# Patient Record
Sex: FEMALE | Race: WHITE | NOT HISPANIC OR LATINO | Employment: OTHER | URBAN - METROPOLITAN AREA
[De-identification: names, ages, dates, MRNs, and addresses within clinical notes are randomized per-mention and may not be internally consistent; named-entity substitution may affect disease eponyms.]

---

## 2021-05-24 ENCOUNTER — RECORDS - HEALTHEAST (OUTPATIENT)
Dept: ADMINISTRATIVE | Facility: CLINIC | Age: 86
End: 2021-05-24

## 2021-05-25 ENCOUNTER — RECORDS - HEALTHEAST (OUTPATIENT)
Dept: ADMINISTRATIVE | Facility: CLINIC | Age: 86
End: 2021-05-25

## 2021-05-27 ENCOUNTER — RECORDS - HEALTHEAST (OUTPATIENT)
Dept: ADMINISTRATIVE | Facility: CLINIC | Age: 86
End: 2021-05-27

## 2021-05-28 ENCOUNTER — RECORDS - HEALTHEAST (OUTPATIENT)
Dept: ADMINISTRATIVE | Facility: CLINIC | Age: 86
End: 2021-05-28

## 2021-05-29 ENCOUNTER — RECORDS - HEALTHEAST (OUTPATIENT)
Dept: ADMINISTRATIVE | Facility: CLINIC | Age: 86
End: 2021-05-29

## 2021-07-13 ENCOUNTER — RECORDS - HEALTHEAST (OUTPATIENT)
Dept: ADMINISTRATIVE | Facility: CLINIC | Age: 86
End: 2021-07-13

## 2021-07-21 ENCOUNTER — RECORDS - HEALTHEAST (OUTPATIENT)
Dept: ADMINISTRATIVE | Facility: CLINIC | Age: 86
End: 2021-07-21

## 2023-09-12 ENCOUNTER — HOSPITAL ENCOUNTER (EMERGENCY)
Facility: CLINIC | Age: 88
Discharge: HOME | End: 2023-09-12
Attending: EMERGENCY MEDICINE | Admitting: EMERGENCY MEDICINE
Payer: MEDICARE

## 2023-09-12 ENCOUNTER — APPOINTMENT (OUTPATIENT)
Dept: RADIOLOGY | Facility: CLINIC | Age: 88
End: 2023-09-12
Attending: EMERGENCY MEDICINE
Payer: MEDICARE

## 2023-09-12 VITALS
SYSTOLIC BLOOD PRESSURE: 124 MMHG | RESPIRATION RATE: 28 BRPM | DIASTOLIC BLOOD PRESSURE: 56 MMHG | OXYGEN SATURATION: 93 % | TEMPERATURE: 97.6 F | WEIGHT: 105 LBS | HEIGHT: 60 IN | HEART RATE: 76 BPM | BODY MASS INDEX: 20.62 KG/M2

## 2023-09-12 DIAGNOSIS — R07.9 CHEST PAIN, UNSPECIFIED TYPE: ICD-10-CM

## 2023-09-12 LAB
ALBUMIN SERPL-MCNC: 3.7 G/DL (ref 3.5–5)
ALP SERPL-CCNC: 77 U/L (ref 45–120)
ALT SERPL W P-5'-P-CCNC: 16 U/L (ref 0–45)
ANION GAP SERPL CALCULATED.3IONS-SCNC: 14 MMOL/L (ref 5–18)
AST SERPL W P-5'-P-CCNC: 18 U/L (ref 0–40)
ATRIAL RATE - MUSE: 72 BPM
BASOPHILS # BLD AUTO: 0 10E3/UL (ref 0–0.2)
BASOPHILS NFR BLD AUTO: 0 %
BILIRUB SERPL-MCNC: 1.1 MG/DL (ref 0–1)
BNP SERPL-MCNC: 154 PG/ML (ref 0–167)
BUN SERPL-MCNC: 19 MG/DL (ref 8–28)
CALCIUM SERPL-MCNC: 9.5 MG/DL (ref 8.5–10.5)
CHLORIDE BLD-SCNC: 101 MMOL/L (ref 98–107)
CO2 SERPL-SCNC: 26 MMOL/L (ref 22–31)
CREAT SERPL-MCNC: 0.88 MG/DL (ref 0.6–1.1)
D DIMER PPP FEU-MCNC: 0.37 UG/ML FEU (ref 0–0.5)
DIASTOLIC BLOOD PRESSURE - MUSE: 75 MMHG
EGFRCR SERPLBLD CKD-EPI 2021: 64 ML/MIN/1.73M2
EOSINOPHIL # BLD AUTO: 0.1 10E3/UL (ref 0–0.7)
EOSINOPHIL NFR BLD AUTO: 1 %
ERYTHROCYTE [DISTWIDTH] IN BLOOD BY AUTOMATED COUNT: 13.7 % (ref 10–15)
GLUCOSE BLD-MCNC: 103 MG/DL (ref 70–125)
HCT VFR BLD AUTO: 43.4 % (ref 35–47)
HGB BLD-MCNC: 14.5 G/DL (ref 11.7–15.7)
IMM GRANULOCYTES # BLD: 0 10E3/UL
IMM GRANULOCYTES NFR BLD: 1 %
INTERPRETATION ECG - MUSE: NORMAL
LYMPHOCYTES # BLD AUTO: 2.3 10E3/UL (ref 0.8–5.3)
LYMPHOCYTES NFR BLD AUTO: 28 %
MCH RBC QN AUTO: 33.6 PG (ref 26.5–33)
MCHC RBC AUTO-ENTMCNC: 33.4 G/DL (ref 31.5–36.5)
MCV RBC AUTO: 101 FL (ref 78–100)
MONOCYTES # BLD AUTO: 0.7 10E3/UL (ref 0–1.3)
MONOCYTES NFR BLD AUTO: 8 %
NEUTROPHILS # BLD AUTO: 5.1 10E3/UL (ref 1.6–8.3)
NEUTROPHILS NFR BLD AUTO: 62 %
NRBC # BLD AUTO: 0 10E3/UL
NRBC BLD AUTO-RTO: 0 /100
P AXIS - MUSE: 81 DEGREES
PLATELET # BLD AUTO: 328 10E3/UL (ref 150–450)
POTASSIUM BLD-SCNC: 4.1 MMOL/L (ref 3.5–5)
PR INTERVAL - MUSE: 162 MS
PROT SERPL-MCNC: 6.7 G/DL (ref 6–8)
QRS DURATION - MUSE: 122 MS
QT - MUSE: 400 MS
QTC - MUSE: 438 MS
R AXIS - MUSE: 68 DEGREES
RBC # BLD AUTO: 4.32 10E6/UL (ref 3.8–5.2)
SODIUM SERPL-SCNC: 141 MMOL/L (ref 136–145)
SYSTOLIC BLOOD PRESSURE - MUSE: 173 MMHG
T AXIS - MUSE: 56 DEGREES
TROPONIN I SERPL-MCNC: 0.02 NG/ML (ref 0–0.29)
TROPONIN I SERPL-MCNC: 0.03 NG/ML (ref 0–0.29)
VENTRICULAR RATE- MUSE: 72 BPM
WBC # BLD AUTO: 8.1 10E3/UL (ref 4–11)

## 2023-09-12 PROCEDURE — 96374 THER/PROPH/DIAG INJ IV PUSH: CPT

## 2023-09-12 PROCEDURE — 85379 FIBRIN DEGRADATION QUANT: CPT | Performed by: EMERGENCY MEDICINE

## 2023-09-12 PROCEDURE — 84484 ASSAY OF TROPONIN QUANT: CPT | Mod: 91 | Performed by: EMERGENCY MEDICINE

## 2023-09-12 PROCEDURE — 83880 ASSAY OF NATRIURETIC PEPTIDE: CPT | Performed by: EMERGENCY MEDICINE

## 2023-09-12 PROCEDURE — 71046 X-RAY EXAM CHEST 2 VIEWS: CPT

## 2023-09-12 PROCEDURE — 85025 COMPLETE CBC W/AUTO DIFF WBC: CPT | Performed by: EMERGENCY MEDICINE

## 2023-09-12 PROCEDURE — 80053 COMPREHEN METABOLIC PANEL: CPT | Performed by: EMERGENCY MEDICINE

## 2023-09-12 PROCEDURE — 93005 ELECTROCARDIOGRAM TRACING: CPT | Performed by: EMERGENCY MEDICINE

## 2023-09-12 PROCEDURE — 250N000011 HC RX IP 250 OP 636: Mod: JZ | Performed by: EMERGENCY MEDICINE

## 2023-09-12 PROCEDURE — 99285 EMERGENCY DEPT VISIT HI MDM: CPT | Mod: 25

## 2023-09-12 PROCEDURE — 250N000013 HC RX MED GY IP 250 OP 250 PS 637: Performed by: EMERGENCY MEDICINE

## 2023-09-12 PROCEDURE — 36415 COLL VENOUS BLD VENIPUNCTURE: CPT | Performed by: EMERGENCY MEDICINE

## 2023-09-12 PROCEDURE — 96375 TX/PRO/DX INJ NEW DRUG ADDON: CPT

## 2023-09-12 RX ORDER — HYDROMORPHONE HCL IN WATER/PF 6 MG/30 ML
0.2 PATIENT CONTROLLED ANALGESIA SYRINGE INTRAVENOUS ONCE
Status: COMPLETED | OUTPATIENT
Start: 2023-09-12 | End: 2023-09-12

## 2023-09-12 RX ORDER — ASPIRIN 81 MG/1
162 TABLET, CHEWABLE ORAL ONCE
Status: COMPLETED | OUTPATIENT
Start: 2023-09-12 | End: 2023-09-12

## 2023-09-12 RX ORDER — ONDANSETRON 2 MG/ML
4 INJECTION INTRAMUSCULAR; INTRAVENOUS ONCE
Status: COMPLETED | OUTPATIENT
Start: 2023-09-12 | End: 2023-09-12

## 2023-09-12 RX ADMIN — ASPIRIN 81 MG CHEWABLE TABLET 162 MG: 81 TABLET CHEWABLE at 02:30

## 2023-09-12 RX ADMIN — HYDROMORPHONE HYDROCHLORIDE 0.2 MG: 0.2 INJECTION, SOLUTION INTRAMUSCULAR; INTRAVENOUS; SUBCUTANEOUS at 02:35

## 2023-09-12 RX ADMIN — ONDANSETRON 4 MG: 2 INJECTION INTRAMUSCULAR; INTRAVENOUS at 02:30

## 2023-09-12 ASSESSMENT — ENCOUNTER SYMPTOMS
LIGHT-HEADEDNESS: 1
SHORTNESS OF BREATH: 1
BACK PAIN: 1

## 2023-09-12 ASSESSMENT — ACTIVITIES OF DAILY LIVING (ADL)
ADLS_ACUITY_SCORE: 35
ADLS_ACUITY_SCORE: 35

## 2023-09-12 NOTE — ED PROVIDER NOTES
EMERGENCY DEPARTMENT ENCOUNTER      NAME: Kylie Amaya  AGE: 87 year old female  YOB: 1935  MRN: [id number]  EVALUATION DATE & TIME: 9/12/2023  2:00 AM    PCP: No primary care provider on file.    ED PROVIDER: Chi Savage MD    Chief Complaint   Patient presents with    Chest Pain    Hip Pain            FINAL IMPRESSION:  No diagnosis found.    ED COURSE & MEDICAL DECISION MAKING:    Pertinent Labs & Imaging studies reviewed. (See chart for details)  87 year old female presents to the Emergency Department for evaluation of chest pain symptoms.  Patient awoke from sleep tonight with a chest pressure sensation she also had feelings of lightheadedness and thought she was potentially going to pass out.  She contacted her daughter and was brought to the emergency department for assessment.  Arrival emergency department her symptoms were improving.  Patient has known hiatal hernia.  She has also been dealing with pain to her right hip rating down her leg and is currently being worked up on outpatient basis.  She is being medicated with Tylenol ibuprofen and Neurontin and steroids on an outpatient basis.  Examination was unremarkable vital signs unremarkable.  The patient was reporting near resolution of her symptoms although she did still have some residual discomfort at arrival and on my initial assessment.  ECG did not appear to be ischemic.  D-dimer negative troponin negative chest x-ray demonstrating her hiatal hernia.  This could be exacerbation of her hiatal hernia due to NSAID use combined with prednisone.  Not clearly esophagitis or gastritis in terms of its description.  Has cardiac risk factors.  With return of all of her test results I went and had a long discussion with the patient and her daughter regarding limitations of emergency department work-up of chest pain and discussing admission to the hospital for serial cardiac enzymes and provocative cardiac testing.  The patient is opposed  to this at this time.  She wants to be discharged home.  Her and her daughter are discussing the options as I laid them out for them.  I think at a minimum we would need to do a 3-hour troponin to ensure no change.  Considerations for admission versus discharge home after 3-hour troponin on antacid medications.  We will reassess after family has discussion    4:42 AM  Discussion again with the patient and her daughter.  She is not at this point willing to be admitted to the hospital in part because she feels improved.  As alternative were going to perform 3-hour troponin and continue to monitor the patient.  If she continues to remain improved and serial troponin is negative anticipate discharge home.  I did discuss with her the possibility that the NSAIDs and prednisone are escalating her hiatal hernia symptoms she already is on pantoprazole at home and is taking that twice daily.  We will reassess after return of the troponin test.    5:15 AM  Patient repeat troponin is within normal limits.  She remains asymptomatic at this time.  We are going to proceed with discharge and close follow-up on outpatient basis.  Discussed these test results with the patient and family member.  Overall plan of care for discharge.  I did instruct him that if the chest pain recurs you should return to the emergency department for repeat assessment.          2:08 AM I met with the patient to gather history and to perform my initial exam. We discussed plans for the ED course, including diagnostic testing and treatment.       Medical Decision Making    History:  Supplemental history from: Documented in chart, if applicable  External Record(s) reviewed: Documented in chart, if applicable.    Work Up:  Chart documentation includes differential considered and any EKGs or imaging independently interpreted by provider, where specified.  In additional to work up documented, I considered the following work up: Documented in chart, if  applicable.    External consultation:  Discussion of management with another provider: Documented in chart, if applicable    Complicating factors:  Care impacted by chronic illness: Cancer/Chemotherapy and Hypertension  Care affected by social determinants of health: Access to Affordable Health Care    Disposition considerations: Discharge. No recommendations on prescription strength medication(s). I considered admission, but discharged patient after significant clinical improvement.        At the conclusion of the encounter I discussed the results of all of the tests and the disposition. The questions were answered. The patient or family acknowledged understanding and was agreeable with the care plan.       MEDICATIONS GIVEN IN THE EMERGENCY:  Medications   HYDROmorphone (DILAUDID) injection 0.2 mg (0.2 mg Intravenous $Given 9/12/23 0235)   ondansetron (ZOFRAN) injection 4 mg (4 mg Intravenous $Given 9/12/23 0230)   aspirin (ASA) chewable tablet 162 mg (162 mg Oral $Given 9/12/23 0230)       NEW PRESCRIPTIONS STARTED AT TODAY'S ER VISIT  New Prescriptions    No medications on file          =================================================================    HPI    Patient information was obtained from: Patient     Use of : N/A     Kylie Amaya is a 87 year old female with a pertinent history of hypertension, skin cancer, who presents to this ED via private car for evaluation of chest pain.     Patient reports a new sudden onset of presyncopal symptoms including tunnel vision, lightheadedness, chest heaviness, and back pain at 1:00AM this morning. No previous history. Currently endorses lightheadedness. Tried tylenol and ibuprofen without relief. Of note, patient has chronic shortness of breath and is scheduled to see her cardiologist next week.     Patient has been seen by [facility] for acute on chronic back pain. She notes the pain radiates from her low back into her right leg. Scheduled  to have an MRI 9/14/23. Currently taking prednisone and gabapentin for pain.     REVIEW OF SYSTEMS   Review of Systems   Respiratory:  Positive for shortness of breath (Chronic).    Cardiovascular:  Positive for chest pain (Pressure).   Musculoskeletal:  Positive for back pain.   Neurological:  Positive for light-headedness. Negative for syncope.        Positive for presyncope     All other systems reviewed and are negative.     Pertinent positives and negatives are documented in the HPI. All other systems reviewed and are negative.    PAST MEDICAL HISTORY:  Hiatal hernia      CURRENT MEDICATIONS:    No current outpatient medications on file.      ALLERGIES:  No Known Allergies    FAMILY HISTORY:  No family history on file.    SOCIAL HISTORY:   Social History     Socioeconomic History    Marital status:        VITALS:  /63   Pulse 74   Temp 97.6  F (36.4  C) (Oral)   Resp 22   Ht 1.524 m (5')   Wt 47.6 kg (105 lb)   SpO2 94%   BMI 20.51 kg/m      PHYSICAL EXAM    PHYSICAL EXAM    Constitutional: Well developed, Well nourished, NAD  HENT: Normocephalic, Atraumatic, Bilateral external ears normal, Oropharynx normal, mucous membranes moist, Nose normal. Neck-  Normal range of motion, No tenderness, Supple, No stridor.   Eyes: PERRL, EOMI, Conjunctiva normal, No discharge.   Respiratory: Normal breath sounds, No respiratory distress, No wheezing, Speaks full sentences easily. No cough.   Cardiovascular: Normal heart rate, Regular rhythm, No murmurs Chest wall nontender.    GI:  Soft, No tenderness, No masses, No flank tenderness. No rebound or guarding.  : No cva tenderness    Musculoskeletal: 2+ DP pulses. No edema. No cyanosis. Good range of motion in all major joints. No tenderness to palpation. No tenderness of the CTLS spine.   Integument: Warm, Dry, No erythema, No rash. No petechiae.   Neurologic: Alert & oriented x 3, Normal motor function, Normal sensory function, No focal deficits noted.    Psychiatric: Affect normal, Judgment normal, Mood normal. Cooperative.     LAB:  All pertinent labs reviewed and interpreted.  Results for orders placed or performed during the hospital encounter of 09/12/23   Chest XR,  PA & LAT    Impression    IMPRESSION: Large hiatal hernia.   Comprehensive metabolic panel   Result Value Ref Range    Sodium 141 136 - 145 mmol/L    Potassium 4.1 3.5 - 5.0 mmol/L    Chloride 101 98 - 107 mmol/L    Carbon Dioxide (CO2) 26 22 - 31 mmol/L    Anion Gap 14 5 - 18 mmol/L    Urea Nitrogen 19 8 - 28 mg/dL    Creatinine 0.88 0.60 - 1.10 mg/dL    Calcium 9.5 8.5 - 10.5 mg/dL    Glucose 103 70 - 125 mg/dL    Alkaline Phosphatase 77 45 - 120 U/L    AST 18 0 - 40 U/L    ALT 16 0 - 45 U/L    Protein Total 6.7 6.0 - 8.0 g/dL    Albumin 3.7 3.5 - 5.0 g/dL    Bilirubin Total 1.1 (H) 0.0 - 1.0 mg/dL    GFR Estimate 64 >60 mL/min/1.73m2   Result Value Ref Range    Troponin I 0.02 0.00 - 0.29 ng/mL   B-Type Natriuretic Peptide ([facility] Only)   Result Value Ref Range     0 - 167 pg/mL   D dimer quantitative   Result Value Ref Range    D-Dimer Quantitative 0.37 0.00 - 0.50 ug/mL FEU   CBC with platelets and differential   Result Value Ref Range    WBC Count 8.1 4.0 - 11.0 10e3/uL    RBC Count 4.32 3.80 - 5.20 10e6/uL    Hemoglobin 14.5 11.7 - 15.7 g/dL    Hematocrit 43.4 35.0 - 47.0 %     (H) 78 - 100 fL    MCH 33.6 (H) 26.5 - 33.0 pg    MCHC 33.4 31.5 - 36.5 g/dL    RDW 13.7 10.0 - 15.0 %    Platelet Count 328 150 - 450 10e3/uL    % Neutrophils 62 %    % Lymphocytes 28 %    % Monocytes 8 %    % Eosinophils 1 %    % Basophils 0 %    % Immature Granulocytes 1 %    NRBCs per 100 WBC 0 <1 /100    Absolute Neutrophils 5.1 1.6 - 8.3 10e3/uL    Absolute Lymphocytes 2.3 0.8 - 5.3 10e3/uL    Absolute Monocytes 0.7 0.0 - 1.3 10e3/uL    Absolute Eosinophils 0.1 0.0 - 0.7 10e3/uL    Absolute Basophils 0.0 0.0 - 0.2 10e3/uL    Absolute Immature Granulocytes 0.0 <=0.4 10e3/uL    Absolute NRBCs 0.0  10e3/uL   ECG 12-LEAD WITH MUSE ([facility])   Result Value Ref Range    Systolic Blood Pressure 173 mmHg    Diastolic Blood Pressure 75 mmHg    Ventricular Rate 72 BPM    Atrial Rate 72 BPM    SD Interval 162 ms    QRS Duration 122 ms     ms    QTc 438 ms    P Axis 81 degrees    R AXIS 68 degrees    T Axis 56 degrees    Interpretation ECG       Sinus rhythm  Right bundle branch block  Abnormal ECG  When compared with ECG of 12-SEP-2023 02:08,  No significant change was found  Confirmed by SEE ED PROVIDER NOTE FOR, ECG INTERPRETATION ([id number]),  Henry Campos ([id number]) on 9/12/2023 2:14:56 AM       RADIOLOGY:  Reviewed all pertinent imaging. Please see official radiology report.  Chest XR,  PA & LAT   Final Result   IMPRESSION: Large hiatal hernia.        EKG:    Performed at: 02:10    Impression: Sinus rhythm. Right bundle branch block     Rate: 72 bpm  Rhythm: Sinus  Axis: 68   SD Interval: 162 ms  QRS Interval: 122 ms   QTc Interval: 438 ms   ST Changes: No significant ST elevations or depressions   Comparison: When compared to EKG from 9/12/23 (initial) no significant change was found.     I have independently reviewed and interpreted the EKG(s) documented above.    I, Deja Osorio, am serving as a scribe to document services personally performed by Chi Savage MD based on my observation and the provider's statements to me. I, Chi Savage MD, attest that Deja Osorio is acting in a scribe capacity, has observed my performance of the services and has documented them in accordance with my direction.    Chi Savage MD  [facility] EMERGENCY ROOM  [address] [id number] [phone]       Chi Savage MD  09/12/23 [id number]

## 2023-09-12 NOTE — ED TRIAGE NOTES
Patient arrives to the ER with c/o chest pain/pressure that started a couple hours ago. Denies shortness of breath. At 0100, patient took 2 Tylenol and 1 ibuprofen for her right hip pain with no relief. Patient states that she has been working with [email or web address] in regards to her hip pain.      Triage Assessment       Row Name 09/12/23 0207       Triage Assessment (Adult)    Airway WDL WDL       Respiratory WDL    Respiratory WDL WDL       Skin Circulation/Temperature WDL    Skin Circulation/Temperature WDL WDL       Cardiac WDL    Cardiac WDL X;chest pain       Chest Pain Assessment    Chest Pain Location midsternal    Chest Pain Radiation back    Character pressure    Precipitating Factors activity       Peripheral/Neurovascular WDL    Peripheral Neurovascular WDL WDL       Cognitive/Neuro/Behavioral WDL    Cognitive/Neuro/Behavioral WDL WDL

## 2023-09-12 NOTE — DISCHARGE INSTRUCTIONS
Overall your work-up in the emergency department was reassuring.  I do recommend close follow-up with your primary care physician as well as our cardiology team.  I have placed a referral to our rapid access cardiology services.  Continue your current medications.  I do recommend considerations for decreasing the ibuprofen usage as this could be exacerbating her hiatal hernia.  Continue to take your antacid medication.  If you have escalation to your symptoms or chest pain recurs I recommend repeat emergency department evaluation.

## 2023-09-16 ENCOUNTER — APPOINTMENT (OUTPATIENT)
Dept: ULTRASOUND IMAGING | Facility: CLINIC | Age: 88
End: 2023-09-16
Attending: EMERGENCY MEDICINE
Payer: MEDICARE

## 2023-09-16 ENCOUNTER — HOSPITAL ENCOUNTER (OUTPATIENT)
Facility: CLINIC | Age: 88
Setting detail: OBSERVATION
Discharge: HOME | End: 2023-09-18
Attending: EMERGENCY MEDICINE | Admitting: EMERGENCY MEDICINE
Payer: MEDICARE

## 2023-09-16 DIAGNOSIS — M25.551 RIGHT HIP PAIN: ICD-10-CM

## 2023-09-16 PROBLEM — I10 ESSENTIAL HYPERTENSION: Status: ACTIVE | Noted: 2023-09-16

## 2023-09-16 PROBLEM — K44.9 HIATAL HERNIA: Status: ACTIVE | Noted: 2022-04-15

## 2023-09-16 PROCEDURE — 96375 TX/PRO/DX INJ NEW DRUG ADDON: CPT

## 2023-09-16 PROCEDURE — 99285 EMERGENCY DEPT VISIT HI MDM: CPT | Mod: 25

## 2023-09-16 PROCEDURE — G0378 HOSPITAL OBSERVATION PER HR: HCPCS

## 2023-09-16 PROCEDURE — 250N000011 HC RX IP 250 OP 636: Mod: JZ | Performed by: STUDENT IN AN ORGANIZED HEALTH CARE EDUCATION/TRAINING PROGRAM

## 2023-09-16 PROCEDURE — 96376 TX/PRO/DX INJ SAME DRUG ADON: CPT

## 2023-09-16 PROCEDURE — 250N000013 HC RX MED GY IP 250 OP 250 PS 637: Performed by: STUDENT IN AN ORGANIZED HEALTH CARE EDUCATION/TRAINING PROGRAM

## 2023-09-16 PROCEDURE — 93971 EXTREMITY STUDY: CPT | Mod: RT

## 2023-09-16 PROCEDURE — 250N000011 HC RX IP 250 OP 636: Performed by: EMERGENCY MEDICINE

## 2023-09-16 PROCEDURE — 96374 THER/PROPH/DIAG INJ IV PUSH: CPT

## 2023-09-16 RX ORDER — GABAPENTIN 300 MG/1
300 CAPSULE ORAL AT BEDTIME
Status: ON HOLD | COMMUNITY
Start: 2023-09-07 | End: 2023-09-18

## 2023-09-16 RX ORDER — ONDANSETRON 2 MG/ML
4 INJECTION INTRAMUSCULAR; INTRAVENOUS EVERY 6 HOURS PRN
Status: DISCONTINUED | OUTPATIENT
Start: 2023-09-16 | End: 2023-09-17

## 2023-09-16 RX ORDER — TRAZODONE HYDROCHLORIDE 50 MG/1
50 TABLET, FILM COATED ORAL
COMMUNITY
End: 2023-09-16

## 2023-09-16 RX ORDER — NALOXONE HYDROCHLORIDE 0.4 MG/ML
0.4 INJECTION, SOLUTION INTRAMUSCULAR; INTRAVENOUS; SUBCUTANEOUS
Status: DISCONTINUED | OUTPATIENT
Start: 2023-09-16 | End: 2023-09-18 | Stop reason: HOSPADM

## 2023-09-16 RX ORDER — NALOXONE HYDROCHLORIDE 0.4 MG/ML
0.2 INJECTION, SOLUTION INTRAMUSCULAR; INTRAVENOUS; SUBCUTANEOUS
Status: DISCONTINUED | OUTPATIENT
Start: 2023-09-16 | End: 2023-09-18 | Stop reason: HOSPADM

## 2023-09-16 RX ORDER — HYDROMORPHONE HYDROCHLORIDE 1 MG/ML
0.5 INJECTION, SOLUTION INTRAMUSCULAR; INTRAVENOUS; SUBCUTANEOUS ONCE
Status: COMPLETED | OUTPATIENT
Start: 2023-09-16 | End: 2023-09-16

## 2023-09-16 RX ORDER — ASPIRIN 81 MG/1
81 TABLET ORAL DAILY
Status: ON HOLD | COMMUNITY
End: 2023-11-24

## 2023-09-16 RX ORDER — CYANOCOBALAMIN 1000 UG/ML
1 INJECTION, SOLUTION INTRAMUSCULAR; SUBCUTANEOUS
COMMUNITY

## 2023-09-16 RX ORDER — ONDANSETRON 2 MG/ML
4 INJECTION INTRAMUSCULAR; INTRAVENOUS ONCE
Status: COMPLETED | OUTPATIENT
Start: 2023-09-16 | End: 2023-09-16

## 2023-09-16 RX ORDER — PANTOPRAZOLE SODIUM 20 MG/1
20 TABLET, DELAYED RELEASE ORAL 2 TIMES DAILY
Status: DISCONTINUED | OUTPATIENT
Start: 2023-09-16 | End: 2023-09-18 | Stop reason: HOSPADM

## 2023-09-16 RX ORDER — FUROSEMIDE 20 MG
20 TABLET ORAL EVERY MORNING
Status: DISCONTINUED | OUTPATIENT
Start: 2023-09-17 | End: 2023-09-18 | Stop reason: HOSPADM

## 2023-09-16 RX ORDER — ONDANSETRON 4 MG/1
4 TABLET, ORALLY DISINTEGRATING ORAL EVERY 6 HOURS PRN
Status: DISCONTINUED | OUTPATIENT
Start: 2023-09-16 | End: 2023-09-17

## 2023-09-16 RX ORDER — PANTOPRAZOLE SODIUM 20 MG/1
20 TABLET, DELAYED RELEASE ORAL
COMMUNITY
End: 2023-09-16

## 2023-09-16 RX ORDER — ASPIRIN 81 MG/1
81 TABLET ORAL DAILY
Status: DISCONTINUED | OUTPATIENT
Start: 2023-09-17 | End: 2023-09-18 | Stop reason: HOSPADM

## 2023-09-16 RX ORDER — CELECOXIB 100 MG/1
100 CAPSULE ORAL DAILY
COMMUNITY
End: 2023-10-31

## 2023-09-16 RX ORDER — GABAPENTIN 300 MG/1
300 CAPSULE ORAL AT BEDTIME
Status: DISCONTINUED | OUTPATIENT
Start: 2023-09-16 | End: 2023-09-16

## 2023-09-16 RX ORDER — PANTOPRAZOLE SODIUM 20 MG/1
20 TABLET, DELAYED RELEASE ORAL 2 TIMES DAILY
COMMUNITY

## 2023-09-16 RX ORDER — ATORVASTATIN CALCIUM 20 MG/1
20 TABLET, FILM COATED ORAL AT BEDTIME
COMMUNITY
End: 2023-11-09

## 2023-09-16 RX ORDER — OXYCODONE HYDROCHLORIDE 5 MG/1
5 TABLET ORAL EVERY 4 HOURS PRN
Status: DISCONTINUED | OUTPATIENT
Start: 2023-09-16 | End: 2023-09-17

## 2023-09-16 RX ORDER — ACETAMINOPHEN 325 MG/1
975 TABLET ORAL EVERY 6 HOURS
Status: DISCONTINUED | OUTPATIENT
Start: 2023-09-16 | End: 2023-09-18 | Stop reason: HOSPADM

## 2023-09-16 RX ORDER — HYDROMORPHONE HCL IN WATER/PF 6 MG/30 ML
0.2 PATIENT CONTROLLED ANALGESIA SYRINGE INTRAVENOUS
Status: DISCONTINUED | OUTPATIENT
Start: 2023-09-16 | End: 2023-09-17

## 2023-09-16 RX ORDER — ATORVASTATIN CALCIUM 10 MG/1
20 TABLET, FILM COATED ORAL AT BEDTIME
Status: DISCONTINUED | OUTPATIENT
Start: 2023-09-16 | End: 2023-09-18 | Stop reason: HOSPADM

## 2023-09-16 RX ORDER — ONDANSETRON 4 MG/1
8 TABLET, FILM COATED ORAL EVERY 8 HOURS PRN
COMMUNITY
Start: 2023-09-13

## 2023-09-16 RX ORDER — LORATADINE 10 MG/1
10 TABLET ORAL DAILY
Status: ON HOLD | COMMUNITY
End: 2023-11-21

## 2023-09-16 RX ORDER — POLYETHYLENE GLYCOL 3350 17 G/17G
17 POWDER, FOR SOLUTION ORAL DAILY
Status: DISCONTINUED | OUTPATIENT
Start: 2023-09-17 | End: 2023-09-18 | Stop reason: HOSPADM

## 2023-09-16 RX ORDER — ACETAMINOPHEN 500 MG
1000 TABLET ORAL EVERY 6 HOURS PRN
Status: ON HOLD | COMMUNITY
End: 2023-09-18

## 2023-09-16 RX ORDER — HYDRALAZINE HYDROCHLORIDE 20 MG/ML
10 INJECTION INTRAMUSCULAR; INTRAVENOUS EVERY 6 HOURS PRN
Status: DISCONTINUED | OUTPATIENT
Start: 2023-09-16 | End: 2023-09-18 | Stop reason: HOSPADM

## 2023-09-16 RX ORDER — GABAPENTIN 300 MG/1
300 CAPSULE ORAL 3 TIMES DAILY
Status: DISCONTINUED | OUTPATIENT
Start: 2023-09-16 | End: 2023-09-18 | Stop reason: HOSPADM

## 2023-09-16 RX ORDER — IBUPROFEN 200 MG
200 TABLET ORAL EVERY 6 HOURS PRN
COMMUNITY
Start: 2023-05-09 | End: 2023-10-31

## 2023-09-16 RX ORDER — FUROSEMIDE 20 MG
20 TABLET ORAL DAILY PRN
COMMUNITY

## 2023-09-16 RX ADMIN — GABAPENTIN 300 MG: 300 CAPSULE ORAL at 22:19

## 2023-09-16 RX ADMIN — HYDROMORPHONE HYDROCHLORIDE 0.5 MG: 1 INJECTION, SOLUTION INTRAMUSCULAR; INTRAVENOUS; SUBCUTANEOUS at 16:05

## 2023-09-16 RX ADMIN — ONDANSETRON 4 MG: 2 INJECTION INTRAMUSCULAR; INTRAVENOUS at 20:08

## 2023-09-16 RX ADMIN — HYDRALAZINE HYDROCHLORIDE 10 MG: 20 INJECTION, SOLUTION INTRAMUSCULAR; INTRAVENOUS at 21:56

## 2023-09-16 RX ADMIN — HYDROMORPHONE HYDROCHLORIDE 0.5 MG: 1 INJECTION, SOLUTION INTRAMUSCULAR; INTRAVENOUS; SUBCUTANEOUS at 19:32

## 2023-09-16 RX ADMIN — PANTOPRAZOLE SODIUM 20 MG: 20 TABLET, DELAYED RELEASE ORAL at 22:19

## 2023-09-16 RX ADMIN — ACETAMINOPHEN 975 MG: 325 TABLET ORAL at 22:19

## 2023-09-16 RX ADMIN — ATORVASTATIN CALCIUM 20 MG: 10 TABLET, FILM COATED ORAL at 22:19

## 2023-09-16 RX ADMIN — HYDROMORPHONE HYDROCHLORIDE 0.2 MG: 0.2 INJECTION, SOLUTION INTRAMUSCULAR; INTRAVENOUS; SUBCUTANEOUS at 22:17

## 2023-09-16 RX ADMIN — ONDANSETRON 4 MG: 2 INJECTION INTRAMUSCULAR; INTRAVENOUS at 16:05

## 2023-09-16 ASSESSMENT — ACTIVITIES OF DAILY LIVING (ADL)
ADLS_ACUITY_SCORE: 24
ADLS_ACUITY_SCORE: 35
ADLS_ACUITY_SCORE: 33
ADLS_ACUITY_SCORE: 35
ADLS_ACUITY_SCORE: 24
DEPENDENT_IADLS:: CLEANING;COOKING;SHOPPING;TRANSPORTATION

## 2023-09-16 NOTE — ED PROVIDER NOTES
EMERGENCY DEPARTMENT ENCOUNTER      NAME: Kylie Amaya  AGE: 87 year old female  YOB: 1935  MRN: [id number]  EVALUATION DATE & TIME: 9/16/2023  2:53 PM    PCP: Catrina Velasco See    ED PROVIDER: Josey Platt MD      Chief Complaint   Patient presents with    Hip Pain         FINAL IMPRESSION:  1. Right hip pain          ED COURSE & MEDICAL DECISION MAKING:    Pertinent Labs & Imaging studies reviewed. (See chart for details)    3:34 PM I introduced myself to the patient, obtained patient history, performed a physical exam, and discussed plan for ED workup including potential diagnostic laboratory/imaging studies and interventions.  4:25 PM I spoke [facility], Dr. Riggs.  6:12 PM I updated the patient.  6:36 PM I checked on the patient.  7:19 PM I spoke with Hospitalist, patient was admitted.     87 year old female with a pertinent history of hypertension, hiatal hernia, left hip replacement, ongoing right hip pain and osteoporosis who presents to this ED for evaluation of right hip pain.  Patient has been having ongoing pain in her right hip and groin area radiating down her leg and somewhat in the right buttock area.  She has been following with [facility] and had an MRI of her lumbar spine as well as her right hip this past Friday.  She states she was called with the results for the spine showing some stenosis but that the spine physician felt that this was likely osteoarthritis causing her right hip pain and not originating from her back.  She has not heard the results of the right hip MRI.  She has a follow-up appointment in 2 days.  Daughter was concerned about her level of pain at home and thus brought her here for evaluation.  Patient lives independently and states she cannot take the medication including the oxycodone that these prescribed for pain due to nausea.  She is also prescribed Celebrex and states she has difficulty taking any of the medicine due to GERD.   She has not taken anything for pain today.  She has been able to ambulate with a walker.  However states it is difficult and painful.  Daughter is concerned about her falling at home.  On exam she does not have any focal neurologic deficits.  Is no signs or symptoms of spinal cord compression at this time.  As she just had this MRI performed yesterday was trying to avoid repeating imaging.  I contacted Dr. Riggs at [facility] as unfortunately I cannot review the results in care everywhere.  He reviewed the spinal as well as hip MRI and agreed that this was severe osteoarthritis likely leading to her right hip pain and will require hip replacement however this does not need to be performed emergently.  He stated she is having the expected amount of pain with what her MRI appears like.  He states there is no sign of fracture and also no sign of cauda equina or other significant spine pathology.  He recommended pain control and discharged with importance of follow-up on Monday as they will discuss operative planning.  We have performed a right lower extremity Doppler venous ultrasound to rule out DVT which was normal.  She had received a dose of 0.5 mg of IV Dilaudid as well as 4 mg of IV Zofran and an additional second dose of IV Dilaudid.  With this she was able to ambulate with a walker to the bathroom without difficulty.  I discussed this potential plan with the patient and her daughter recommended by orthopedics.  Daughter is concerned about her ability to function at home with the pain that she has that she lives independently and that she has no help at home.  Discussed that orthopedics would not perform any emergent procedures here but that if she feels the patient cannot function at home she could be admitted for further pain control but likely will not have anything further performed here.  Discussed that unfortunately to obtain pain control she is likely going to have some side effects from  the medication and it is a balance.  Also discussed that as she is able to ambulate here with a walker we would recommend she continue to do so and could potentially be discharged however again the daughter and the patient requested admission.  I spoke with the hospitalist who excepted the patient for admission for intractable pain.  They will consult the pain team.  Patient was admitted in stable condition.         At the conclusion of the encounter I discussed the results of all of the tests and the disposition. The questions were answered. The patient or family acknowledged understanding and was agreeable with the care plan.           Medical Decision Making    History:  Supplemental history from: Documented in chart, if applicable  External Record(s) reviewed: Documented in chart, if applicable.    Work Up:  Chart documentation includes differential considered and any EKGs or imaging independently interpreted by provider.  In additional to work up documented, I considered the following work up: Documented in chart, if applicable.    External consultation:  Discussion of management with another provider: Documented in chart, if applicable    Complicating factors:  Care impacted by chronic illness: Chronic Pain and Hypertension  Care affected by social determinants of health: N/A    Disposition considerations: Admit.      MEDICATIONS GIVEN IN THE EMERGENCY:  Medications   HYDROmorphone (PF) (DILAUDID) injection 0.5 mg (0.5 mg Intravenous $Given 9/16/23 1605)   ondansetron (ZOFRAN) injection 4 mg (4 mg Intravenous $Given 9/16/23 1605)   HYDROmorphone (PF) (DILAUDID) injection 0.5 mg (0.5 mg Intravenous $Given 9/16/23 1932)       NEW PRESCRIPTIONS STARTED AT TODAY'S ER VISIT           =================================================================    HPI    Patient information was obtained from: Patient and family member    Use of : N/A       Kylie Amaya is a 87 year old female with a pertinent  "history of hypertension, hiatal hernia, left hip replacement, and osteoporosis who presents to this ED for evaluation of hip pain.    Per chart review, patient presented to [facility] Emergency Room on 9/12/23 for evaluation of hip and chest pain. Patient reported a new sudden onset of presyncopal symptoms including tunnel vision, lightheadedness, chest heaviness, and back pain at 1:00AM this morning. No previous history. Tried tylenol and ibuprofen without relief. Of note, patient has chronic shortness of breath and is scheduled to see her cardiologist next week. Work up unremarkable at that time.      Patient has been seen by [facility] for acute on chronic back pain. She notes the pain radiates from her low back into her right leg. Scheduled to have an MRI 9/14/23. Currently taking prednisone and gabapentin for pain.     Patient reports ongoing right sided hip pain for the past few weeks. She states that she went to [facility] for an MRI of back and hips on 9/7. Patient said since her physical therapy appointment on 8/28, her right hip pain has progressively gotten worse. She notes that the pain radiates to her groin and into her right leg. She describes the pain in her right leg as a \"pulsing\" sensation. Due to her pain, patient has trouble walking and can not bend over. She usually uses a walker. She experiences pain even when she is lying down. Patient can't tolerate her pain medications because they make her nauseous and cause abdominal pain due to her hiatal hernia. Patient has tried taking Zofran with no relief. She has not taken anything for pain today but she did take hydrocodone yesterday. Patient has an appointment for her hip on 9/18 with [facility]. Daughter however was concerned about pain and her living independently and thus brought her to the ER today. Pain is not specifically different than it has been over the past multiple weeks. Patient denies fall or trauma to the right " hip.    Patient denies numbness, tingling, saddle anesthesia, bowel or bladder incontinence, fever, cough, new shortness of breath, chest pain, abdominal pain, swelling, vomiting, diarrhea, and other acute symptoms.    REVIEW OF SYSTEMS   Review of Systems   Pertinent positives and negatives are documented in the HPI. All other systems reviewed and are negative.      PAST MEDICAL HISTORY:  No past medical history on file.    PAST SURGICAL HISTORY:  No past surgical history on file.        CURRENT MEDICATIONS:    acetaminophen (TYLENOL) 325 MG tablet  aspirin 81 MG EC tablet  atorvastatin (LIPITOR) 20 MG tablet  celecoxib (CELEBREX) 100 MG capsule  cyanocobalamin (CYANOCOBALAMIN) 1000 MCG/ML injection  diclofenac (VOLTAREN) 1 % topical gel  furosemide (LASIX) 20 MG tablet  gabapentin (NEURONTIN) 300 MG capsule  hydrOXYzine (ATARAX) 25 MG tablet  ibuprofen (ADVIL/MOTRIN) 200 MG tablet  lidocaine (XYLOCAINE) 5 % external ointment  loratadine (CLARITIN) 10 MG tablet  ondansetron (ZOFRAN) 4 MG tablet  pantoprazole (PROTONIX) 20 MG EC tablet        ALLERGIES:  No Known Allergies    FAMILY HISTORY:  No family history on file.    SOCIAL HISTORY:   Social History     Socioeconomic History    Marital status:        VITALS:  /60 (BP Location: Right arm)   Pulse 60   Temp 98.6  F (37  C) (Oral)   Resp 16   Ht 1.524 m (5')   Wt 47.6 kg (105 lb)   SpO2 93%   BMI 20.51 kg/m      PHYSICAL EXAM    Physical Exam  Constitutional: Well developed, Well nourished, NAD, GCS 15  HENT: Normocephalic, Atraumatic, Bilateral external ears normal, Oropharynx normal, mucous membranes moist, Nose normal. Neck-  Normal range of motion, No tenderness, Supple, No stridor.    Eyes: PERRL, EOMI, Conjunctiva normal, No discharge.   Respiratory: Normal breath sounds, No respiratory distress, No wheezing or crackles, Speaks in full sentences easily.    Cardiovascular: Normal heart rate, Regular rhythm,  No murmurs, No rubs, No  gallops. 2+ radial pulses bilaterally  GI: Bowel sounds normal, Soft, No tenderness, No masses, No rebound or guarding. No CVA tenderness bilaterally    Musculoskeletal: 2+ DP and PT pulses. No notable lower extremity edema.  Good range of motion in all major joints. Does report pain in right hip with movement but no joint swelling, erythema, or limited ROM. No major deformities noted. No midline tenderness of the CTLS spine. Pain diffusely in the right hip/groin and into the buttock and right lower back.   Integument: Warm, Dry, No erythema, No rash. No petechiae.   Neurologic: Alert & oriented x 3, 5/5 strength in all 4 extremities bilaterally. Sensation intact to light touch in all 4 extremities and the face bilaterally. No focal deficits noted.  Psychiatric: Affect normal, Judgment normal, Mood normal. Cooperative.      LAB:  All pertinent labs reviewed and interpreted.  Results for orders placed or performed during the hospital encounter of 09/16/23   US Lower Extremity Venous Duplex Right    Impression    IMPRESSION:  1.  No deep venous thrombosis in the right lower extremity.       RADIOLOGY:  Reviewed all pertinent imaging. Please see official radiology report.  US Lower Extremity Venous Duplex Right   Final Result   IMPRESSION:   1.  No deep venous thrombosis in the right lower extremity.            PROCEDURES:   None      [facility] Documentation:   CMS Diagnoses:               I, Demarco Lacy, am serving as a scribe to document services personally performed by Josey Platt MD based on my observation and the provider's statements to me. I, Josey Platt MD, attest that Demarco Lacy  is acting in a scribe capacity, has observed my performance of the services and has documented them in accordance with my direction.    Josey Platt MD  [facility] EMERGENCY ROOM  [address] [address] [phone]     Josey Platt,  MD  10/02/23 7947

## 2023-09-16 NOTE — ED TRIAGE NOTES
The patient presents to the ED with right hip pain that has been present for the past 5-6 weeks. The patient states she is seeing [facility] and had an MRI of her back and hips done recently. They are unsure what is causing the pain. Previous left hip surgery. Was seen here last week for a hiatal hernia.   Nothing for pain today, states she is having difficulty tolerating tramadol or vicodin due to nausea when she takes those medications.

## 2023-09-17 ENCOUNTER — APPOINTMENT (OUTPATIENT)
Dept: PHYSICAL THERAPY | Facility: CLINIC | Age: 88
End: 2023-09-17
Attending: STUDENT IN AN ORGANIZED HEALTH CARE EDUCATION/TRAINING PROGRAM
Payer: MEDICARE

## 2023-09-17 ENCOUNTER — APPOINTMENT (OUTPATIENT)
Dept: OCCUPATIONAL THERAPY | Facility: CLINIC | Age: 88
End: 2023-09-17
Attending: STUDENT IN AN ORGANIZED HEALTH CARE EDUCATION/TRAINING PROGRAM
Payer: MEDICARE

## 2023-09-17 PROCEDURE — 97535 SELF CARE MNGMENT TRAINING: CPT | Mod: GO

## 2023-09-17 PROCEDURE — 250N000009 HC RX 250: Performed by: PHYSICIAN ASSISTANT

## 2023-09-17 PROCEDURE — 97165 OT EVAL LOW COMPLEX 30 MIN: CPT | Mod: GO

## 2023-09-17 PROCEDURE — 250N000013 HC RX MED GY IP 250 OP 250 PS 637: Performed by: STUDENT IN AN ORGANIZED HEALTH CARE EDUCATION/TRAINING PROGRAM

## 2023-09-17 PROCEDURE — G0378 HOSPITAL OBSERVATION PER HR: HCPCS

## 2023-09-17 PROCEDURE — 250N000013 HC RX MED GY IP 250 OP 250 PS 637

## 2023-09-17 PROCEDURE — 96376 TX/PRO/DX INJ SAME DRUG ADON: CPT

## 2023-09-17 PROCEDURE — 250N000011 HC RX IP 250 OP 636: Mod: JZ | Performed by: STUDENT IN AN ORGANIZED HEALTH CARE EDUCATION/TRAINING PROGRAM

## 2023-09-17 PROCEDURE — 97161 PT EVAL LOW COMPLEX 20 MIN: CPT | Mod: GP | Performed by: PHYSICAL THERAPIST

## 2023-09-17 PROCEDURE — 250N000013 HC RX MED GY IP 250 OP 250 PS 637: Performed by: PHYSICIAN ASSISTANT

## 2023-09-17 PROCEDURE — 250N000013 HC RX MED GY IP 250 OP 250 PS 637: Performed by: ORTHOPAEDIC SURGERY

## 2023-09-17 PROCEDURE — 97116 GAIT TRAINING THERAPY: CPT | Mod: GP | Performed by: PHYSICAL THERAPIST

## 2023-09-17 PROCEDURE — 99222 1ST HOSP IP/OBS MODERATE 55: CPT | Mod: GC | Performed by: STUDENT IN AN ORGANIZED HEALTH CARE EDUCATION/TRAINING PROGRAM

## 2023-09-17 RX ORDER — SENNOSIDES 8.6 MG
8.6 TABLET ORAL 2 TIMES DAILY PRN
Status: DISCONTINUED | OUTPATIENT
Start: 2023-09-17 | End: 2023-09-18 | Stop reason: HOSPADM

## 2023-09-17 RX ORDER — SENNOSIDES 8.6 MG
8.6 TABLET ORAL 2 TIMES DAILY
Status: DISCONTINUED | OUTPATIENT
Start: 2023-09-17 | End: 2023-09-17

## 2023-09-17 RX ORDER — HYDROXYZINE HYDROCHLORIDE 25 MG/1
25 TABLET, FILM COATED ORAL EVERY 6 HOURS PRN
Status: DISCONTINUED | OUTPATIENT
Start: 2023-09-17 | End: 2023-09-18 | Stop reason: HOSPADM

## 2023-09-17 RX ORDER — HYDROXYZINE HYDROCHLORIDE 25 MG/1
50 TABLET, FILM COATED ORAL EVERY 6 HOURS PRN
Status: DISCONTINUED | OUTPATIENT
Start: 2023-09-17 | End: 2023-09-17 | Stop reason: SINTOL

## 2023-09-17 RX ORDER — LIDOCAINE 50 MG/G
OINTMENT TOPICAL 4 TIMES DAILY
Status: DISCONTINUED | OUTPATIENT
Start: 2023-09-17 | End: 2023-09-18 | Stop reason: HOSPADM

## 2023-09-17 RX ORDER — PROMETHAZINE HYDROCHLORIDE 25 MG/1
25 TABLET ORAL EVERY 6 HOURS PRN
Status: DISCONTINUED | OUTPATIENT
Start: 2023-09-17 | End: 2023-09-18 | Stop reason: HOSPADM

## 2023-09-17 RX ORDER — CALCIUM CARBONATE 500 MG/1
500 TABLET, CHEWABLE ORAL DAILY PRN
Status: DISCONTINUED | OUTPATIENT
Start: 2023-09-17 | End: 2023-09-18 | Stop reason: HOSPADM

## 2023-09-17 RX ADMIN — PANTOPRAZOLE SODIUM 20 MG: 20 TABLET, DELAYED RELEASE ORAL at 18:34

## 2023-09-17 RX ADMIN — HYDROMORPHONE HYDROCHLORIDE 0.2 MG: 0.2 INJECTION, SOLUTION INTRAMUSCULAR; INTRAVENOUS; SUBCUTANEOUS at 02:18

## 2023-09-17 RX ADMIN — FUROSEMIDE 20 MG: 20 TABLET ORAL at 08:57

## 2023-09-17 RX ADMIN — GABAPENTIN 300 MG: 300 CAPSULE ORAL at 08:57

## 2023-09-17 RX ADMIN — ONDANSETRON 4 MG: 2 INJECTION INTRAMUSCULAR; INTRAVENOUS at 02:16

## 2023-09-17 RX ADMIN — LIDOCAINE: 50 OINTMENT TOPICAL at 14:27

## 2023-09-17 RX ADMIN — SENNOSIDES 8.6 MG: 8.6 TABLET, FILM COATED ORAL at 08:56

## 2023-09-17 RX ADMIN — LIDOCAINE: 50 OINTMENT TOPICAL at 20:45

## 2023-09-17 RX ADMIN — ACETAMINOPHEN 975 MG: 325 TABLET ORAL at 08:57

## 2023-09-17 RX ADMIN — ATORVASTATIN CALCIUM 20 MG: 10 TABLET, FILM COATED ORAL at 20:48

## 2023-09-17 RX ADMIN — DICLOFENAC 2 G: 10 GEL TOPICAL at 18:19

## 2023-09-17 RX ADMIN — PANTOPRAZOLE SODIUM 20 MG: 20 TABLET, DELAYED RELEASE ORAL at 08:56

## 2023-09-17 RX ADMIN — GABAPENTIN 300 MG: 300 CAPSULE ORAL at 18:18

## 2023-09-17 RX ADMIN — ASPIRIN 81 MG: 81 TABLET, COATED ORAL at 08:57

## 2023-09-17 RX ADMIN — HYDROXYZINE HYDROCHLORIDE 25 MG: 25 TABLET, FILM COATED ORAL at 08:59

## 2023-09-17 RX ADMIN — DICLOFENAC 2 G: 10 GEL TOPICAL at 12:43

## 2023-09-17 RX ADMIN — CALCIUM CARBONATE (ANTACID) CHEW TAB 500 MG 500 MG: 500 CHEW TAB at 11:31

## 2023-09-17 RX ADMIN — ACETAMINOPHEN 975 MG: 325 TABLET ORAL at 18:18

## 2023-09-17 RX ADMIN — PROMETHAZINE HYDROCHLORIDE 25 MG: 25 TABLET ORAL at 09:43

## 2023-09-17 ASSESSMENT — ACTIVITIES OF DAILY LIVING (ADL)
ADLS_ACUITY_SCORE: 25
ADLS_ACUITY_SCORE: 25
ADLS_ACUITY_SCORE: 26
ADLS_ACUITY_SCORE: 25
ADLS_ACUITY_SCORE: 24
ADLS_ACUITY_SCORE: 24
ADLS_ACUITY_SCORE: 25
ADLS_ACUITY_SCORE: 24

## 2023-09-17 NOTE — PHARMACY-ADMISSION MEDICATION HISTORY
Pharmacist Admission Medication History    Admission medication history is complete. The information provided in this note is only as accurate as the sources available at the time of the update.    Medication reconciliation/reorder completed by provider prior to medication history? No    Information Source(s): Patient and CareEverywhere/SureScripts via in-person    Pertinent Information:     1) Tramadol, Norco - patient notes she stopped taking these medications due to nausea, not tolerating them.  2) Trazodone - she stopped taking this since the gabapentin was replacing this for sleep and pain; however, she hasn't been taking the gabapentin for a few days.    Changes made to PTA medication list:  Added: all medications are new to this encounter    Medication Affordability:       Allergies reviewed with patient and updates made in EHR: yes    Medications available for use during hospital stay: NONE.     Medication History Completed By: Rosa Emmanuel RPH 9/16/2023 7:45 PM    PTA Med List   Medication Sig Last Dose    acetaminophen (TYLENOL) 500 MG tablet Take 1,000 mg by mouth every 6 hours as needed for mild pain 9/15/2023    aspirin 81 MG EC tablet Take 81 mg by mouth daily 9/16/2023 at am    atorvastatin (LIPITOR) 20 MG tablet Take 20 mg by mouth At Bedtime 9/15/2023 at pm    celecoxib (CELEBREX) 100 MG capsule Take 100 mg by mouth daily 9/16/2023 at am    cyanocobalamin (CYANOCOBALAMIN) 1000 MCG/ML injection Inject 1 mL into the muscle every 30 days Past Month    furosemide (LASIX) 20 MG tablet Take 20 mg by mouth every morning 9/16/2023 at am    gabapentin (NEURONTIN) 300 MG capsule Take 300 mg by mouth At Bedtime Past Week    ibuprofen (ADVIL/MOTRIN) 200 MG tablet Take 200 mg by mouth every 6 hours as needed for pain 9/15/2023    loratadine (CLARITIN) 10 MG tablet Take 10 mg by mouth daily 9/16/2023 at am    ondansetron (ZOFRAN) 4 MG tablet Take 4 mg by mouth every 8 hours as needed for nausea 9/15/2023     pantoprazole (PROTONIX) 20 MG EC tablet Take 20 mg by mouth 2 times daily 9/16/2023 at am

## 2023-09-17 NOTE — H&P
[facility]    History and Physical - Hospitalist Service       Date of Admission:  9/16/2023    Assessment & Plan      Kylie Amaya is a 87 year old female who has a history of aortic stenosis, CAD, dislipidemia, hypertension, hiatal hernia/reflux, osteoporosis, insomnia who presents due to right hip/leg pain and is admitted for pain control and safe disposition.     Right hip pain  Osteoarthritis of the right hip  Presenting with uncontrolled pain with p.o. medications. Pain on the right hip radiating to the groin and right leg. RLE US negative for DVT. She follows with [facility] orthoped[facility].  Had MRI 9/14/2023 of the right hip and lower back.  ED provider spoke with orthopedic surgeon who reported MRI showed osteoarthritis of the hip and stenosis in the back. Patient would need hip replacement. Patient has an appointment with ortho on Monday 9/18/23. RLE neurovascularly intact on exam.  She has been on Celebrex, gabapentin, Norco and tramadol in the past with no relief.  Patient reported that the Norco and tramadol gave her GI side effects. S/p Dilaudid 0.5 mg IV x2 in the ED.  Pain: Scheduled Tylenol 975mg q6 for mild pain, PO oxycodone 5mg q4h prn for moderate pain, IV Dilaudid 0.2mg q3h prn for severe pain.  Increased PTA gabapentin 300mg from qd to TID   Social consult  Ortho consult   PT/OT    Hypertension  Patient takes 20 mg of Lasix.  Blood pressure on admission elevated to 175/77.  Elevated blood pressure secondary to pain.  PTA Lasix 20 mg  Pain control as above  As needed hydralazine    SOB  Patient has been having shortness of breath with laying flat, and pending.  Saturating well on room air.  Troponin negative, EKG and chest x-ray unremarkable.  She does have an appointment with cardiology on Thursday.    Chronic conditions  CAD PTA aspirin  hyperlipidemia PTA atorvastatin  Hiatal hernia, reflux PTA Protonix  Insomnia: Patient not on any meds, melatonin as needed      Diet: Regular Diet Adult  DVT Prophylaxis: Pneumatic Compression Devices  Cobos Catheter: Not present  Fluids: PO  Lines: None     Cardiac Monitoring: None  Code Status: No CPR- Do NOT Intubate    Clinically Significant Risk Factors Present on Admission                # Drug Induced Platelet Defect: home medication list includes an antiplatelet medication     # Hypertension: Noted on problem list                 Disposition Plan      Expected Discharge Date: 09/17/2023      Destination: home with help/services          The patient's care was discussed with the Attending Physician, Dr. Barrientos .      Radha Diamond MD  Hospitalist Service  [facility]  Securely message with [employer] (more info)  Text page via [facility] Paging/Directory   ______________________________________________________________________    Chief Complaint   Right hip and leg pain     History is obtained from the patient    History of Present Illness   Kylie Amaya is a 87 year old female who has a history of aortic stenosis, CAD, dislipidemia, hypertension, hiatal hernia/reflux, osteoporosis, insomnia who presents due to right hip/leg pain and is admitted for pain control and safe disposition.     Patient presented to  ED on 9/12/23 due to hip pain and chest pain, lightheadedness., cardiac work-up including EKG, chest x-ray, troponins, D-dimer at that time was unremarkable.  Patient declined admission for further cardiac work-up.  That time symptoms started to be secondary to hiatal hernia.  Patient following [facility] orthopedics for acute on chronic back and hip pain.  Had an MRI done on 9/14/2023.    Presenting this time due to ongoing pain for multiple weeks no relief on outpatient meds.  Patient reporting GI side effects from the pain medication including upset stomach and nausea.  Reports the pain is 10 out of 10, located in the right hip radiating down the leg.  She denies bladder or bowel dysfunction.  Patient also  reporting shortness of breath when laying flat or bending.  Otherwise denies chest pain, palpitations, lightheadedness.  Is scheduled to follow-up with cardiology on Thursday.  ED provider spoke with orthopedic surgeon about MRI.  Ortho reported that she does have arthritis of the hip and stenosis in the back.  She has been on Celebrex, gabapentin, Norco and tramadol in the past with no relief.  Patient reported that the Norco gave her GI side effects.  Patient complaining of pain with ambulation.  Lives independently at home.  Daughter and patient do not feel safe to be discharged home.  She has an appointment with Ortho on Monday.    In the ED, vitals notable for elevated blood pressure 175/77, otherwise vitals within normal.  BMP, CBC, troponin, D-dimer, chest x-ray, right lower extremity ultrasound unremarkable.  EKG showed sinus rhythm with a right bundle branch block.    In the ED patient received 0.5 mg IV Dilaudid, Zofran  Past Medical History    No past medical history on file.    Past Surgical History   No past surgical history on file.    Prior to Admission Medications   Prior to Admission Medications   Prescriptions Last Dose Informant Patient Reported? Taking?   acetaminophen (TYLENOL) 500 MG tablet 9/15/2023  Yes Yes   Sig: Take 1,000 mg by mouth every 6 hours as needed for mild pain   aspirin 81 MG EC tablet 9/16/2023 at am  Yes Yes   Sig: Take 81 mg by mouth daily   atorvastatin (LIPITOR) 20 MG tablet 9/15/2023 at pm  Yes Yes   Sig: Take 20 mg by mouth At Bedtime   celecoxib (CELEBREX) 100 MG capsule 9/16/2023 at am  Yes Yes   Sig: Take 100 mg by mouth daily   cyanocobalamin (CYANOCOBALAMIN) 1000 MCG/ML injection Past Month  Yes Yes   Sig: Inject 1 mL into the muscle every 30 days   furosemide (LASIX) 20 MG tablet 9/16/2023 at am  Yes Yes   Sig: Take 20 mg by mouth every morning   gabapentin (NEURONTIN) 300 MG capsule Past Week  Yes Yes   Sig: Take 300 mg by mouth At Bedtime   ibuprofen  (ADVIL/MOTRIN) 200 MG tablet 9/15/2023  Yes Yes   Sig: Take 200 mg by mouth every 6 hours as needed for pain   loratadine (CLARITIN) 10 MG tablet 9/16/2023 at am  Yes Yes   Sig: Take 10 mg by mouth daily   ondansetron (ZOFRAN) 4 MG tablet 9/15/2023  Yes Yes   Sig: Take 4 mg by mouth every 8 hours as needed for nausea   pantoprazole (PROTONIX) 20 MG EC tablet 9/16/2023 at am  Yes Yes   Sig: Take 20 mg by mouth 2 times daily      Facility-Administered Medications: None        Review of Systems    The 10 point Review of Systems is negative other than noted in the HPI or here.      Physical Exam   Vital Signs: Temp: 98.7  F (37.1  C) Temp src: Temporal BP: (!) 186/89 Pulse: 90   Resp: 16 SpO2: 95 %      Weight: 105 lbs 0 oz    Constitutional: awake, alert, cooperative, no apparent distress, and appears stated age  Respiratory: No increased work of breathing, good air exchange, clear to auscultation bilaterally, no crackles or wheezing  Cardiovascular: Regular rate and rhythm, normal S1 and S2, no S3 or S4, and systolic murmur noted  GI: Normal bowel sounds, soft, non-distended, non-tender  Musculoskeletal: There is no redness, warmth, or swelling of the joints. Limited range of motion of the right lower extremity secondary to pain. Normal DP and PT pulses, sensation, intact lateral lower extremities.  Neurologic: Awake, alert, oriented to name, place and time.   Medical Decision Making       Please see A&P for additional details of medical decision making.      Data   ------------------------- PAST 24 HR DATA REVIEWED -----------------------------------------------

## 2023-09-17 NOTE — CONSULTS
[facility] Orthopaedics Progress Note      Consult for right hip pain    SUBJECTIVE   Patient presented to the ED yesterday for right hip pain radiating into the RLE. She was ultimately admitted for pain control. She has a history of a recent right intra articular hip injection which did provide moderate relief for a few days, but did wear off quickly. She had a recent Mri which did show degenerative changes and she does have some lumbar stenosis. Patient notes the pain is mostly in the groin region with some intermittent radiating pain into the leg. She is only able to tolerate IV Dilaudid, as PO pain medication cause severe nausea. She notes she has only tired Zofran, which does not provide much relief. Today she notes 8/10 pain in the groin. She has not been moving much with PT as this does exacerbate the pain. She denies any SOB, chest pain, fever, chills. No numbness/tingling to the RLE.      She has been taking home Celebrex and Tramadol without relief. She is unable to take NSAIDs, due to a hernia she notes. She does have an outpatient appointment to discuss the hip with [facility] tomorrow morning.     Objective:  Blood pressure (!) 142/66, pulse 95, temperature 98.4  F (36.9  C), temperature source Oral, resp. rate 17, height 1.524 m (5'), weight 47.6 kg (105 lb), SpO2 93 %.    Patient Vitals for the past 24 hrs:   BP Temp Temp src Pulse Resp SpO2 Height Weight   09/17/23 0753 (!) 142/66 98.4  F (36.9  C) Oral 95 17 93 % -- --   09/17/23 0450 131/66 98.6  F (37  C) Oral 91 16 93 % -- --   09/16/23 2331 (!) 142/72 97.8  F (36.6  C) Oral 101 16 92 % -- --   09/16/23 2124 (!) 206/91 98.2  F (36.8  C) Oral 97 16 94 % -- --   09/16/23 1915 (!) 186/89 -- -- 90 -- 95 % -- --   09/16/23 1815 (!) 190/102 -- -- 79 -- 96 % -- --   09/16/23 1739 (!) 191/88 -- -- 76 -- 96 % -- --   09/16/23 1724 (!) 186/87 -- -- 80 -- 97 % -- --   09/16/23 1645 (!) 189/88 -- -- 79 -- 95 % -- --   09/16/23 1630 (!) 178/93 --  -- 84 -- 95 % -- --   09/16/23 1606 (!) 180/81 -- -- 87 -- 94 % -- --   09/16/23 1155 (!) 175/77 98.7  F (37.1  C) Temporal 84 16 96 % 1.524 m (5') 47.6 kg (105 lb)       Wt Readings from Last 4 Encounters:   09/16/23 47.6 kg (105 lb)   09/12/23 47.6 kg (105 lb)       RIGHT HIP: Skin is intact without open lesions or drainage. No erythema, no ecchymosis, no swelling or edema, no increased warmth or signs of infection. No tenderness to palpation at the hip. She had increased groin pain with internal/external rotation of the hip. She has full flexion and extension with limited internal rotation. She has a positive straight leg raise but this pain is not her main concern. No gross motor or sensory deficits. compartments are soft and non-tender.     Pertinent Labs   Lab Results: personally reviewed.     Recent Labs   Lab Test 09/12/23  0222   HGB 14.5   HCT 43.4   *             Plan: Anticoagulation protocol: Aspirin 81 mg BID home dosing              Pain medications:  Pending pain team evaluation and recommendation - changed nausea medication to Phenergan and added Vistaril            Weight bearing status:  WBAT            Disposition:  Today, as there is no orthopedic indication for admission.  Recommending pain management evaluation with likely outpatient pain management - follow up for pending joint replacement three months post hip injection, scheduled for tomorrow morning- Stable from Orthopedic standpoint            Continue cares and rehabilitation  - Discussed case with medicine and pain management    Report completed by:  OLY CLEMONS PA-C  Date: 9/17/2023  Time: 8:33 AM

## 2023-09-17 NOTE — PROGRESS NOTES
"   09/17/23 1100   Appointment Info   Signing Clinician's Name / Credentials (OT) Lottie Khan, MOTR/L, CLT   Rehab Comments (OT) OT phyllis   Quick Adds   Quick Adds Certification   Living Environment   People in Home alone   Current Living Arrangements independent living facility   Home Accessibility no concerns   Transportation Anticipated family or friend will provide   Living Environment Comments [place] not have to walk far   Self-Care   Usual Activity Tolerance good   Current Activity Tolerance moderate   Regular Exercise Yes  (she completes exercises from prior therapy)   Exercise Amount/Frequency daily   Equipment Currently Used at Home walker, rolling;grab bar, tub/shower;grab bar, toilet;other (see comments)  (vanity near toilet; dtr has shower chair available for use PRN)   Activity/Exercise/Self-Care Comment indep with ADL and receives assist for IADL from 3 dtrs who all live closeby, (ie. they make meals for her and drive her) d/t low vision.   General Information   Onset of Illness/Injury or Date of Surgery 09/16/23   Referring Physician Dr. Barrientos   Patient/Family Therapy Goal Statement (OT) home soon   Additional Occupational Profile Info/Pertinent History of Current Problem mod: R hip pain; Per MD note:Kylie Amaya is a 87 year old female who was admitted on 9/16/2023. I was asked to see the patient for right hip pain control GI side effects with po opioids. History of aortic stenosis, CAD, dislipidemia, hypertension, hiatal hernia/reflux, osteoporosis, insomnia who presents due to right hip/leg pain and is admitted for pain control and safe disposition.   Existing Precautions/Restrictions no known precautions/restrictions;other (see comments)  (pt/dtr states \"not supposed to do certain exercises like leg lifts for now\" per the doctor.)   Limitations/Impairments visual   General Observations and Info pt appears able to maneuver with FWW and able to compensate in unfamiliar environment during our " session.   Cognitive Status Examination   Orientation Status orientation to person, place and time   Affect/Mental Status (Cognitive) WFL   Visual Perception   Visual Impairment/Limitations other (see comments)  (per RN, only peripheral vision intact)   Sensory   Sensory Quick Adds sensation intact   Pain Assessment   Patient Currently in Pain   (no complaints)   Posture   Posture not impaired   Range of Motion Comprehensive   General Range of Motion no range of motion deficits identified   Strength Comprehensive (MMT)   Comment, General Manual Muscle Testing (MMT) Assessment generalized weakness   Muscle Tone Assessment   Muscle Tone Quick Adds No deficits were identified   Coordination   Upper Extremity Coordination No deficits were identified   Bed Mobility   Comment (Bed Mobility) CGA   Transfers   Transfer Comments CGA   Balance   Balance Comments decreased   Activities of Daily Living   BADL Assessment/Intervention lower body dressing;toileting;grooming   Lower Body Dressing Assessment/Training   [place] Level (Lower Body Dressing) minimum assist (75% patient effort)   Grooming Assessment/Training   [place] Level (Grooming) contact guard assist   Toileting   [place] Level (Toileting) contact guard assist   Clinical Impression   Criteria for Skilled Therapeutic Interventions Met (OT) Yes, treatment indicated   OT Diagnosis decr ADL indep   Influenced by the following impairments R hip pain   OT Problem List-Impairments impacting ADL activity tolerance impaired;balance;mobility;vision;pain   Assessment of Occupational Performance 3-5 Performance Deficits   Identified Performance Deficits home mgmt, LE dress, toileting, bathing   Planned Therapy Interventions (OT) ADL retraining;bed mobility training;transfer training;strengthening   Clinical Decision Making Complexity (OT) moderate complexity   Risk & Benefits of therapy have been explained care plan/treatment goals reviewed;daughter;patient   OT  Total Evaluation Time   OT Eval, Low Complexity Minutes (64392) 10   Therapy Certification   Medical Diagnosis hip pain   Start of Care Date 09/17/23   Certification date from 09/17/23   Certification date to 10/17/23   OT Goals   Therapy Frequency (OT) Daily   OT Predicted Duration/Target Date for Goal Attainment 09/19/23   OT Goals Lower Body Dressing;Toilet Transfer/Toileting;OT Goal 1   OT: Lower Body Dressing Modified independent;using adaptive equipment;within precautions;Goal Met;Completed   OT: Toilet Transfer/Toileting Modified independent;cleaning and garment management;toilet transfer;Goal Met;Completed   OT: Goal 1 Pt will complete 10 mins of UE exercises to increase endurance and strength for ADL independence/safety.   Interventions   Interventions Quick Adds Self-Care/Home Management   Self-Care/Home Management   Self-Care/Home Mgmt/ADL, Compensatory, Meal Prep Minutes (42065) 23   Symptoms Noted During/After Treatment (Meal Preparation/Planning Training) none   Treatment Detail/Skilled Intervention Educated pt in all transfers including bed, toilet, chair, and shower. Pt mod I with FWW for bed, toilet, shower, and chair with cues for tech  after instruction. Educ in LE dressing with precautions for painful LE. Pt mod I for LE dressing including shorts/shoes after instruction.   Bed mobility mod I.  All questions answered. Pt mod I with toileting and washing hands and completing g/h in standing with FWW/grab bar after instruction. All tasks requ'd incr time/effort second to generalized pain.   OT Discharge Planning   OT Plan only UE ex program for home, then d/c.   OT Discharge Recommendation (DC Rec) (S)  home with assist   OT Rationale for DC Rec great support at home where dtrs assist pt with IADL PRN   OT Brief overview of current status mod I with FWW for BADL   Total Session Time   Timed Code Treatment Minutes 23   Total Session Time (sum of timed and untimed services) 33    [facility]  Rehabilitation Services  OUTPATIENT OCCUPATIONAL THERAPY  EVALUATION  PLAN OF TREATMENT FOR OUTPATIENT REHABILITATION  (COMPLETE FOR INITIAL CLAIMS ONLY)  Patient's Last Name, First Name, M.I.  YOB: 1935  Kylie Amaya                          Provider's Name  [facility] Medical Record No.  [id number]                             Onset Date:  09/16/23   Start of Care Date:  09/17/23   Type:     ___PT   _X_OT   ___SLP Medical Diagnosis:  hip pain                    OT Diagnosis:  decr ADL indep Visits from SOC:  1     See note for plan of treatment, functional goals and certification details    I CERTIFY THE NEED FOR THESE SERVICES FURNISHED UNDER        THIS PLAN OF TREATMENT AND WHILE UNDER MY CARE     (Physician co-signature of this document indicates review and certification of the therapy plan).

## 2023-09-17 NOTE — PLAN OF CARE
Goal Outcome Evaluation:    Pt AxO, reports 9/10 pain scale, administered PRN IV dilaudid, pt declined oral oxycodone d/t possible n/v. Pt and daughter state oral narcotics cause nausea, denies nausea at time of pain med request and RN reminded pt and daughter that IV zofran was previously administered. Pt started crying and expressed not wanting any oral narcotics and specifically wanting only IV pain meds. Pt was also administered IV hydralazine for bp of 206/91. Pt has just arrived from ED with hypertension. /72 after recheck and med administration. Pt denies sob, chest pain, dizziness and reports nausea improved w/zofran. Pt is resting comfortably, call light within reach, calls appropriately and is able to make needs known. Will continue to monitor and tx as needed.

## 2023-09-17 NOTE — CONSULTS
Care Management Initial Consult    General Information  Assessment completed with: Patient, Children, Patient and daughter Brooke at bedside  Type of CM/SW Visit: Initial Assessment    Primary Care Provider verified and updated as needed: Yes   Readmission within the last 30 days: no previous admission in last 30 days      Reason for Consult: discharge planning  Advance Care Planning: Advance Care Planning Reviewed: no concerns identified     General Information Comments: [facility] alone [facility]    Communication Assessment  Patient's communication style: spoken language (English or Bilingual)    Hearing Difficulty or Deaf: no   Wear Glasses or Blind: yes    Cognitive  Cognitive/Neuro/Behavioral:    WDL                    Living Environment:   People in home: alone     Current living Arrangements: independent living facility      Able to return to prior arrangements: other (see comments) (With home services)       Family/Social Support:  Care provided by: self, child(niels)  Provides care for: no one  Marital Status:   Children          Description of Support System: Supportive, Involved    Support Assessment: Adequate family and caregiver support    Current Resources:   Patient receiving home care services: No     Community Resources: None  Equipment currently used at home: walker, standard  Supplies currently used at home: None    Employment/Financial:  Employment Status: retired        Financial Concerns: No concerns identified         Does the patient's insurance plan have a 3 day qualifying hospital stay waiver?  No    Lifestyle & Psychosocial Needs:  Social Determinants of Health     Tobacco Use: Not on file   Alcohol Use: Not on file   Financial Resource Strain: Not on file   Food Insecurity: Not on file   Transportation Needs: Not on file   Physical Activity: Not on file   Stress: Not on file   Social Connections: Not on file   Intimate Partner Violence: Not on file   Depression: Not on  file   Housing Stability: Not on file       Functional Status:  Prior to admission patient needed assistance:   Dependent ADLs:: Ambulation-walker  Dependent IADLs:: Cleaning, Cooking, Shopping, Transportation  Assesssment of Functional Status: Not at  functional baseline    Mental Health Status:          Chemical Dependency Status:              Values/Beliefs:  Spiritual, Cultural Beliefs, Sabianism Practices, Values that affect care:                 Additional Information:  87-year-old female presents to this ED for evaluation of hip pain.   Per chart review, patient presented to [facility] Emergency Room on 9/12/23 for evaluation of hip and chest pain. Patient reported a new sudden onset of presyncopal symptoms including tunnel vision, lightheadedness, chest heaviness, and back pain at 1:00AM this morning. No previous history. Currently endorses lightheadedness. Tried Tylenol and ibuprofen without relief. Of note, patient has chronic shortness of breath and is scheduled to see her cardiologist next week.   Patient has been seen by [facility] for acute on chronic back pain. She notes the pain radiates from her low back into her right leg. Scheduled to have an MRI 9/14/23. Currently taking prednisone and gabapentin for pain .    Reports she lives alone at [facility]. States independence with most I/ADLs. Uses a standard walker; no home care services; doesn't drive. Has cleaning service and family assist with meals and other chores as needed/requested by patient. Explained RAYMOND/Observation Status to patient and daughter at bedside. Discussed discharge services with patient who now goes to outpatient physical therapy. Would consider discussing home care services but is not interested in discussing TCU at this point. Informed patient that Unit CM will follow-up with her after therapy consults. Family will transport patient on discharge.      KELLIE MEDINA RN/CM

## 2023-09-17 NOTE — PROGRESS NOTES
[facility]    Progress Note - Hospitalist Service       Date of Admission:  9/16/2023    Assessment & Plan   Kylie Amaya is a 87 year old female aortic stenosis, dyslipidemia, hypertension, hiatal hernia with acid reflux, multiple joint osteoarthritis, osteoporosis, and insomnia.  She is admitted on 9/16/2023 for pain control of right hip secondary to osteoarthritis.    Osteoarthritis of the right hip  Right hip pain  Patient has known right hip osteoarthritis.  Patient follows with [facility] who completed an MRI on 9/14/2023 of right hip and lower back showing osteoarthritis of the hip and stenosis in the spine.  Corticosteroid right hip injection completed on 8/24/2023 under fluoroscopy without relief.:  Pain control has been Celebrex, gabapentin, Norco, and tramadol which causes nausea and vomiting.  Right lower extremity ultrasound is negative for DVT.  [facility] consulted and saw patient today.  No further recommendations.  Not a candidate for surgery at this time due to recent steroid injection.  Okay to discharge.  Follow-up 9/18 if discharged in time  Pain team consult: Appreciate recommendations and cares  Scheduled lidocaine ointment and diclofenac gel to right hip, Tylenol 975 mg q6h, gabapentin 300 mg TID, hydroxyzine 25 mg q6h PRN  No oxycodone or Dilaudid recommended  Likely right hip arthroplasty in 3 months  PT/OT     Hypertension  Blood pressure on admission elevated to 175/77 likely secondary to pain.  PTA Lasix 20 mg  Pain control as above  As needed hydralazine    Constipation  Likely secondary to opioid use.  Last bowel movement 4 days ago.  Scheduled MiraLAX and senna.  May change senna as needed as bowel movements begin again.    SOB  Patient has been having shortness of breath with laying flat, and bending.  Saturating well on room air.  Troponin negative, EKG and chest x-ray unremarkable.  She does have an appointment with cardiology on 9/21.     Chronic  conditions  CAD PTA aspirin  hyperlipidemia PTA atorvastatin  Hiatal hernia, reflux PTA Protonix  Insomnia: Patient not on any meds, melatonin as needed     Diet: Regular Diet Adult    DVT Prophylaxis: Low Risk/Ambulatory with no VTE prophylaxis indicated and Pneumatic Compression Devices  Cobos Catheter: Not present  Fluids: PO  Lines: None     Cardiac Monitoring: None  Code Status: No CPR- Do NOT Intubate      Clinically Significant Risk Factors Present on Admission     # Drug Induced Platelet Defect: home medication list includes an antiplatelet medication   # Hypertension: Noted on problem list                 Disposition Plan      Expected Discharge Date: 09/18/2023    Discharge Delays: OT Disposition recs needed  PT Disposition recs needed  Destination: home with help/services  Discharge Comments: Staying another night per B[facility]        The patient's care was discussed with the Attending Physician, Dr. Maryt Barrientos .    Melissa Burnett MD  Hospitalist Service  [facility]  Securely message with [employer] (more info)  Text page via [facility] Paging/Directory   ______________________________________________________________________    Interval History   Patient was seen this morning laying in bed comfortably.  She endorses right hip pain.  She has already discussed plans with orthopedic surgeon from [facility] and pain control with the [facility] pain team.  Patient states there is a pain plan moving forward (which is outlined above).  They state that surgery will not be completed until 3 months after her most recent steroid joint injection of the right hip.    I discussed care with orthopedic surgeon, Kapil Lopez.  He states he placed a pain consult, and is unable to offer any surgery at this point.  He states he must be 3 months out of recent joint injection to have surgery.  She has a follow-up appointment at the clinic tomorrow, 9/18/2023.  From their standpoint, patient can  discharge.    Patient states that on arrival last night to the unit, she experienced a poor interaction with nursing staff.  Patient states that she was told that this was not a reason to be admitted into the hospital.  She asked the nurse to tell her what medication she was given.  She states the nurse responded as these are the medications that were ordered so should be taken.  She did not name the medications.  Patient also informed nursing staff that she had poor vision, so she would like to be told what is happening.  She states that the nurse talk to her like a 4-year-old stating I am not scanning your bracelet and and now walking to the computer.    Patient was checked on later in the afternoon.  She walked with physical therapy for quite a distance.  Her pain is now 8/10 compared to 7/10 before physical therapy.  She is very tired.  She states she does not feel like she can go home with this amount of pain.  Nursing staff just placed lidocaine gel on her right hip.  Patient endorses bowel movement.    Physical Exam   Vital Signs: Temp: 97.8  F (36.6  C) Temp src: Oral BP: 108/54 Pulse: 87   Resp: 18 SpO2: 93 % O2 Device: None (Room air)    Weight: 105 lbs 0 oz    General appearance: alert, in no distress, cooperative  Head: normocephalic, without obvious abnormalities  Ears: hearing grossly intact  Lung: clear to auscultation bilaterally, no wheezing, coughing, or use of accessory muscles  Heart: regular rate and rhythm, S1, S2 normal, 3/6 systolic murmur. No click, rub, or gallop  Abdomen: soft, non-tender, bowel sounds present in all four quadrants  Extremities: warm, dry, no edema  Skin: normal color, texture, and turgor on exposed skin  Neurologic: Sensation grossly intact in lower extremities, able to flex bilateral hips, ambulatory  Psychologic: Appropriate mood    Data     Imaging results reviewed over the past 24 hrs:   Recent Results (from the past 24 hour(s))   US Lower Extremity Venous Duplex  Right    Narrative    EXAM: US LOWER EXTREMITY VENOUS DUPLEX RIGHT  LOCATION: [facility]  DATE: 9/16/2023    INDICATION: right leg, groin pain, eval for DVT  COMPARISON: None.  TECHNIQUE: Venous Duplex ultrasound of the right lower extremity with and without compression, augmentation and duplex. Color flow and spectral Doppler with waveform analysis performed.    FINDINGS: Exam includes the common femoral, femoral, popliteal, and contralateral common femoral veins as well as segmentally visualized deep calf veins and greater saphenous vein.     RIGHT: No deep vein thrombosis. No superficial thrombophlebitis. No popliteal cyst.      Impression    IMPRESSION:  1.  No deep venous thrombosis in the right lower extremity.

## 2023-09-17 NOTE — PROGRESS NOTES
[facility] ACUTE PAIN SERVICE    ([facility], [facility], [facility])   Consult Note    Date of Admission:  9/16/2023  Date of Consult: 09/17/23  Physician requesting consult:  Phoenix Akhtar MD  -Joon SALMON with [facility]   Reason for consult: right hip pain control     Assessment/Plan:     Kylie Amaya is a 87 year old female who was admitted on 9/16/2023. I was asked to see the patient for right hip pain control GI side effects with po opioids. History of aortic stenosis, CAD, dislipidemia, hypertension, hiatal hernia/reflux, osteoporosis, insomnia who presents due to right hip/leg pain and is admitted for pain control and safe disposition.     Pain meds causing nausea at home (vicodin/tramadol). Pt has used x 2 doses of IV dilaudid 0.2 mg and x 2 doses of IV dilaudid 0.5 mg.     Pain leg tolerable 8/10, having more pain upper abdominal area - under breast bone - has hernia - takes pepcid - pt is having heart burn right now as well. On Protonix BID - requesting tums - will ask provider - okay'd will order prn. Last BM Tuesday/Wednesday - usually has BMs daily - but hasn't eaten much in the past week, bc of the nausea.     PLAN:   1) Pain is consistent with OA of the right hip uncontrolled on home medications. Pain on the right hip radiating to the groin and right leg. Follows with [facility] ortho. ED provider spoke with orthopedic surgeon who reported MRI showed osteoarthritis of the hip and stenosis in the back. Patient would need hip replacement. Patient has an appointment with ortho on Monday 9/18/23.   2)Multimodal Medication Therapy  Topical: Schedule lidocaine ointment and diclofenac gel for to the right hip  NSAID'S: Crcl = 35.1 ml/min  Muscle Relaxants:none  Adjuvants: APAP 975 mg po q6h, gabapentin 300 mg po TID (takes at bedtime at home for sleep and radiculopathy - this is max dose for crcl), hydroxyzine 25 mg po q6h prn   Antidepressants/anxiolytics:none  Opioids: oxycodone 5 mg  po q4h prn - stop this - no indication   IV Pain medication: dilaudid 0.2 mg IV q3h prn - stop this.   3)Non-medication interventions: PT/OT regular diet  Acupuncture consult- as available  Integrative consult - as available  4)Constipation Prophylaxis: Scheduled and prn   5) Follow up   -Discharge Recommendations - We recommend prescribing the following at the time of discharge: TBD - NSAIDs not recommended d/t renal function - would stop these at home and use APAP instead and other adjuvants.     MN  pulled from system on 09/17/23. Last refill on 9/15/23 for norco 5 #20 (4 day supply) from [facility], and on 9/13/23 for tramadol 50 mg #21 (7 day supply) from same provider. This indicated low but recent opioid opioid use.        History of Present Illness (HPI):       Kylie Amaya is a 87 year old old female with PMH as above. Per MN  review, the patient is opioid naive.     Home pain meds: APAP 1g po q6h prn, celebrex 100 mg po daily, gabapentin 300 mg po at bedtime, ibuprofen 200 mg po q6h prn pain, Norco rx filled 5/325 on 9/15/23 #20 (4 day supply), filled tramadol 50 mg on 9/13/23 #21 (7 day supply), trazodone 50 mg po at bedtime prn    Review of medical record/Summary of labs and care everywhere.       Vanessa Caldwell, PharmD, BCPS  Acute Care Pain Management Program  [facility] (Joe SCHNEIDER, [place])   Preference if for [facility] Paging - Ruegg  Click HERE to page Yvonne

## 2023-09-18 VITALS
DIASTOLIC BLOOD PRESSURE: 60 MMHG | WEIGHT: 105 LBS | HEART RATE: 60 BPM | HEIGHT: 60 IN | SYSTOLIC BLOOD PRESSURE: 120 MMHG | OXYGEN SATURATION: 93 % | BODY MASS INDEX: 20.62 KG/M2 | TEMPERATURE: 98.6 F | RESPIRATION RATE: 16 BRPM

## 2023-09-18 PROCEDURE — G0378 HOSPITAL OBSERVATION PER HR: HCPCS

## 2023-09-18 PROCEDURE — 99238 HOSP IP/OBS DSCHRG MGMT 30/<: CPT

## 2023-09-18 PROCEDURE — 250N000013 HC RX MED GY IP 250 OP 250 PS 637: Performed by: STUDENT IN AN ORGANIZED HEALTH CARE EDUCATION/TRAINING PROGRAM

## 2023-09-18 RX ORDER — HYDROXYZINE HYDROCHLORIDE 25 MG/1
25 TABLET, FILM COATED ORAL EVERY 6 HOURS PRN
Qty: 30 TABLET | Refills: 0 | Status: SHIPPED | OUTPATIENT
Start: 2023-09-18 | End: 2023-10-31

## 2023-09-18 RX ORDER — ACETAMINOPHEN 325 MG/1
975 TABLET ORAL EVERY 6 HOURS
Qty: 180 TABLET | Refills: 0 | Status: ON HOLD | OUTPATIENT
Start: 2023-09-18 | End: 2023-11-22

## 2023-09-18 RX ORDER — GABAPENTIN 300 MG/1
300 CAPSULE ORAL 2 TIMES DAILY
Qty: 60 CAPSULE | Refills: 0 | Status: SHIPPED | OUTPATIENT
Start: 2023-09-18 | End: 2023-10-31

## 2023-09-18 RX ORDER — LIDOCAINE 50 MG/G
OINTMENT TOPICAL 4 TIMES DAILY
Qty: 200 G | Refills: 0 | Status: SHIPPED | OUTPATIENT
Start: 2023-09-18 | End: 2023-11-09

## 2023-09-18 RX ADMIN — DICLOFENAC 2 G: 10 GEL TOPICAL at 10:13

## 2023-09-18 RX ADMIN — ACETAMINOPHEN 975 MG: 325 TABLET ORAL at 10:15

## 2023-09-18 RX ADMIN — PANTOPRAZOLE SODIUM 20 MG: 20 TABLET, DELAYED RELEASE ORAL at 10:15

## 2023-09-18 RX ADMIN — GABAPENTIN 300 MG: 300 CAPSULE ORAL at 00:58

## 2023-09-18 RX ADMIN — GABAPENTIN 300 MG: 300 CAPSULE ORAL at 10:14

## 2023-09-18 RX ADMIN — ACETAMINOPHEN 975 MG: 325 TABLET ORAL at 02:39

## 2023-09-18 RX ADMIN — FUROSEMIDE 20 MG: 20 TABLET ORAL at 10:15

## 2023-09-18 RX ADMIN — LIDOCAINE: 50 OINTMENT TOPICAL at 10:26

## 2023-09-18 RX ADMIN — ASPIRIN 81 MG: 81 TABLET, COATED ORAL at 10:15

## 2023-09-18 ASSESSMENT — ACTIVITIES OF DAILY LIVING (ADL)
ADLS_ACUITY_SCORE: 27
ADLS_ACUITY_SCORE: 28
ADLS_ACUITY_SCORE: 28
ADLS_ACUITY_SCORE: 27
ADLS_ACUITY_SCORE: 28

## 2023-09-18 NOTE — PROGRESS NOTES
"[facility] ACUTE PAIN SERVICE    ([facility], [facility], [facility])      Assessment/Plan:     Kylie Amaya is a 87 year old female who was admitted on 9/16/2023. I was asked to see the patient for right hip pain control GI side effects with po opioids. History of aortic stenosis, CAD, dislipidemia, hypertension, hiatal hernia/reflux, osteoporosis, insomnia who presents due to right hip/leg pain and is admitted for pain control and safe disposition.     Pain meds causing nausea at home (vicodin/tramadol).   Appropriate to discharge from pain perspective, does have ortho appt to discuss hip OA. Pain is now mild (spikes to moderate).     Plans to discharge today. PLAN:   1) Pain is consistent with OA of the right hip uncontrolled on home medications. Pain on the right hip radiating to the groin and right leg. Follows with [facility] ortho. ED provider spoke with orthopedic surgeon who reported MRI showed osteoarthritis of the hip and stenosis in the back. Patient would need hip replacement. Patient has an appointment with ortho on Monday 9/18/23.   2)Multimodal Medication Therapy  Topical: Schedule lidocaine ointment and diclofenac gel for to the right hip  NSAID'S: Crcl = 35.1 ml/min  Muscle Relaxants:none  Adjuvants: APAP 975 mg po q6h, gabapentin 300 mg po TID (takes at bedtime at home for sleep and radiculopathy - this is max dose for crcl)\" can resume home dosing at discharge (she is unsure if increase has been helpful here), hydroxyzine 25 mg po q6h prn   Antidepressants/anxiolytics:none  Opioids: complete no indication   IV Pain medication: complete  3)Non-medication interventions: PT/OT regular diet  Acupuncture consult- as available  Integrative consult - as available  4)Constipation Prophylaxis: Scheduled and prn   5) Follow up   -Discharge Recommendations - We recommend prescribing the following at the time of discharge:    No opioids.  Can resume home gabapentin dosing at discharge. "   NSAIDs not recommended d/t renal function - would stop these at home and use APAP instead and other adjuvants. (She is aware).   Would continue lidocaine ointment and Voltaren gel as has been beneficial.   Should not need pain related Scripts.     MN  pulled from system on 09/17/23. Last refill on 9/15/23 for norco 5 #20 (4 day supply) from [facility], and on 9/13/23 for tramadol 50 mg #21 (7 day supply) from same provider. This indicated low but recent opioid opioid use.       Danielle Sosa, PHarmD  Acute Care Pain Management Program  [facility] (Joe SCHNEIDER, [place])   Preference if for [facility] Paging - [place]  Click HERE to page Yvonne

## 2023-09-18 NOTE — PLAN OF CARE
"Goal Outcome Evaluation:  PRIMARY DIAGNOSIS: \"GENERIC\" NURSING  OUTPATIENT/OBSERVATION GOALS TO BE MET BEFORE DISCHARGE:  ADLs back to baseline: Yes    Activity and level of assistance: Requires assist of 1    Pain status: Improved-controlled with oral pain medications.    Return to near baseline physical activity: Yes     Discharge Planner Nurse   Safe discharge environment identified: Yes  Barriers to discharge: Yes       Entered by: Rona Mendez RN 09/18/2023 6:37 AM   Please review provider order for any additional goals.   Nurse to notify provider when observation goals have been met and patient is ready for discharge.   Patient is alert and oriented X 4. Pain controlled with scheduled Tylenol. IV saline locked. VSS on RA. Bed alarms activated for safety.                    "

## 2023-09-18 NOTE — DISCHARGE SUMMARY
[facility]  Discharge Summary - Medicine & Pediatrics       Date of Admission:  9/16/2023  Date of Discharge:  9/18/2023  Discharging Provider: Dr. Jose Garcia  Discharge Service: Hospitalist Service    Discharge Diagnoses   Patient Active Problem List   Diagnosis    Diverticular disease of colon    Essential hypertension    Hiatal hernia    Osteoporosis    Right hip pain     Follow-ups Needed After Discharge   Follow-up with PCP within 7 days to evaluate medication changes:  -Switched gabapentin from 300 mg TID to 300 mg BID given fatigue; recommend further discussion at PCP visit    Discharge Disposition   Discharged to home  Condition at discharge: Stable    Hospital Course   Kylie Amaya is a 87 year old female with aortic stenosis, dyslipidemia, hypertension, hiatal hernia with acid reflux, multiple joint osteoarthritis, osteoporosis, and insomnia. She was admitted on 9/16/2023 for pain control of right hip secondary to osteoarthritis. The patient typically follows with [facility]. Corticosteroid right hip injection under fluoroscopy was last completed on 8/24/23, and MRI was last completed on 9/14/23 which showed osteoarthritis of the hip and stenosis of the spine. [facility] saw patient on 9/18/23 and discussed that she is not a candidate for surgery at this time given recent steroid injection and to follow-up outpatient. Pain team followed with the patient while inpatient and recommended lidocaine ointment, diclofenac gel, Tylenol, gabapentin, and PRN hydroxyzine for pain. Patient reports well-controlled pain on day of discharge. She notes concern for fatigue with increased dose of gabapentin but otherwise has tolerated her current medication regimen well.    Osteoarthritis of the right hip  Right hip pain  Patient follows with [facility]. MRI on 9/14/2023 of right hip and lower back showing osteoarthritis of the hip and stenosis in the spine.  Corticosteroid right hip injection completed on 8/24/2023 under fluoroscopy without relief. Per [facility], not a candidate for for surgery at this time due to recent steroid injection.  Scheduled lidocaine ointment and diclofenac gel to right hip, Tylenol 975 mg q6h, gabapentin 300 mg BID, hydroxyzine 25 mg q6h PRN  No oxycodone or Dilaudid recommended  Likely right hip arthroplasty in 3 months     Hypertension  PTA Lasix 20 mg     Constipation  Likely secondary to opioid use.    Scheduled MiraLAX and Senna while hospitalized    SOB  Patient has been having shortness of breath with laying flat, and bending.  Saturating well on room air.  Troponin negative, EKG and chest x-ray unremarkable.  She does have an appointment with cardiology on 9/21.     Chronic conditions  CAD PTA aspirin  hyperlipidemia PTA atorvastatin  Hiatal hernia, reflux PTA Protonix  Insomnia: Patient not on any meds, melatonin as needed    Consultations This Hospital Stay   CARE MANAGEMENT / SOCIAL WORK IP CONSULT  PHYSICAL THERAPY ADULT IP CONSULT  OCCUPATIONAL THERAPY ADULT IP CONSULT  ORTHOPEDIC SURGERY IP CONSULT  PAIN MANAGEMENT ADULT IP CONSULT    Code Status   No CPR- Do NOT Intubate       The patient was discussed with Dr. Garcia.    MAYE SCHAFER MD  [facility] Team Service  [address] [address]  Phone: [phone]  Fax: [phone]  ______________________________________________________________________    Physical Exam   Vital Signs: Temp: 97.5  F (36.4  C) Temp src: Oral BP: (!) 159/72 Pulse: 72   Resp: 16 SpO2: 92 % O2 Device: None (Room air)    Weight: 105 lbs 0 oz  General appearance: alert, comfortable-appearing, no acute distress  HEENT: normocephalic, without obvious abnormalities  Lungs: nonlabored breathing on room air, speaking in complete sentences  CV: skin is warm and well-perfused  Extremities: warm, dry, no edema  Skin: no suspicious rash or lesion on  exposed skin  Neurologic: no apparent focal deficits  Psychologic: appropriate mood and affect      Primary Care Physician   Catrina Velasco    Discharge Orders      Follow-up and recommended labs and tests     Follow up with primary care provider within 7 days to evaluate medication change.     Activity    Your activity upon discharge: activity as tolerated     Reason for your hospital stay    Severe hip pain     Diet    Follow this diet upon discharge: Orders Placed This Encounter      Regular Diet Adult       Significant Results and Procedures   Results for orders placed or performed during the hospital encounter of 09/16/23   US Lower Extremity Venous Duplex Right    Narrative    EXAM: US LOWER EXTREMITY VENOUS DUPLEX RIGHT  LOCATION: [facility]  DATE: 9/16/2023    INDICATION: right leg, groin pain, eval for DVT  COMPARISON: None.  TECHNIQUE: Venous Duplex ultrasound of the right lower extremity with and without compression, augmentation and duplex. Color flow and spectral Doppler with waveform analysis performed.    FINDINGS: Exam includes the common femoral, femoral, popliteal, and contralateral common femoral veins as well as segmentally visualized deep calf veins and greater saphenous vein.     RIGHT: No deep vein thrombosis. No superficial thrombophlebitis. No popliteal cyst.      Impression    IMPRESSION:  1.  No deep venous thrombosis in the right lower extremity.       Discharge Medications   Current Discharge Medication List        START taking these medications    Details   diclofenac (VOLTAREN) 1 % topical gel Apply 2 g topically 4 times daily  Qty: 200 g, Refills: 0    Associated Diagnoses: Right hip pain      hydrOXYzine (ATARAX) 25 MG tablet Take 1 tablet (25 mg) by mouth every 6 hours as needed for other (adjuvant pain)  Qty: 30 tablet, Refills: 0    Associated Diagnoses: Right hip pain      lidocaine (XYLOCAINE) 5 % external ointment Apply topically 4 times daily  Qty: 200  g, Refills: 0    Associated Diagnoses: Right hip pain           CONTINUE these medications which have CHANGED    Details   acetaminophen (TYLENOL) 325 MG tablet Take 3 tablets (975 mg) by mouth every 6 hours  Qty: 180 tablet, Refills: 0    Associated Diagnoses: Right hip pain      gabapentin (NEURONTIN) 300 MG capsule Take 1 capsule (300 mg) by mouth 2 times daily  Qty: 60 capsule, Refills: 0    Associated Diagnoses: Right hip pain           CONTINUE these medications which have NOT CHANGED    Details   aspirin 81 MG EC tablet Take 81 mg by mouth daily      atorvastatin (LIPITOR) 20 MG tablet Take 20 mg by mouth At Bedtime      celecoxib (CELEBREX) 100 MG capsule Take 100 mg by mouth daily      cyanocobalamin (CYANOCOBALAMIN) 1000 MCG/ML injection Inject 1 mL into the muscle every 30 days      furosemide (LASIX) 20 MG tablet Take 20 mg by mouth every morning      ibuprofen (ADVIL/MOTRIN) 200 MG tablet Take 200 mg by mouth every 6 hours as needed for pain      loratadine (CLARITIN) 10 MG tablet Take 10 mg by mouth daily      ondansetron (ZOFRAN) 4 MG tablet Take 4 mg by mouth every 8 hours as needed for nausea      pantoprazole (PROTONIX) 20 MG EC tablet Take 20 mg by mouth 2 times daily           Allergies   No Known Allergies

## 2023-09-18 NOTE — PROGRESS NOTES
Care Management Discharge Note    Discharge Date: 09/18/2023       Discharge Disposition: Home    Discharge Services: None    Discharge DME: None    Discharge Transportation: family or friend will provide    Private pay costs discussed: Not applicable    Does the patient's insurance plan have a 3 day qualifying hospital stay waiver?  No    PAS Confirmation Code: N/A  Patient/family educated on Medicare website which has current facility and service quality ratings: no    Education Provided on the Discharge Plan: Yes  Persons Notified of Discharge Plans: Pt and dtr  Patient/Family in Agreement with the Plan: yes    Handoff Referral Completed: No    Additional Information:  SWCM met with Pt and daughter who was visiting. Pt lives at [facility].  [place] back [place].  Daughter will call [facility] and for an appointment as Pt was supposed to go today.  Daughter will ask [facility] if Out Pt PT is needed as it had stopped due to pain.  No CM needs desired or identified.  Dtr to transport.     MAXIME Dickinson

## 2023-09-18 NOTE — PROGRESS NOTES
PRIMARY DIAGNOSIS: ACUTE PAIN  OUTPATIENT/OBSERVATION GOALS TO BE MET BEFORE DISCHARGE:  1. Pain Status: Improved but still requiring IV narcotics.    2. Return to near baseline physical activity: No    3. Cleared for discharge by consultants (if involved): No    Discharge Planner Nurse   Safe discharge environment identified: Yes  Barriers to discharge: Yes--pain control       Entered by: PAULA TORREZ RN 09/17/2023 7:46 PM     Please review provider order for any additional goals.   Nurse to notify provider when observation goals have been met and patient is ready for discharge.

## 2023-09-18 NOTE — PROGRESS NOTES
PRIMARY DIAGNOSIS: ACUTE PAIN  OUTPATIENT/OBSERVATION GOALS TO BE MET BEFORE DISCHARGE:  1. Pain Status: Improved-controlled with oral pain medications.    2. Return to near baseline physical activity: No    3. Cleared for discharge by consultants (if involved): No    Discharge Planner Nurse   Safe discharge environment identified: Yes  Barriers to discharge: Yes-- further pain control       Entered by: PAULA TORREZ RN 09/17/2023 7:46 PM     Please review provider order for any additional goals.   Nurse to notify provider when observation goals have been met and patient is ready for discharge.

## 2023-09-18 NOTE — PROGRESS NOTES
PRIMARY DIAGNOSIS: ACUTE PAIN  OUTPATIENT/OBSERVATION GOALS TO BE MET BEFORE DISCHARGE:  1. Pain Status: Improved-controlled with oral pain medications.    2. Return to near baseline physical activity: Yes    3. Cleared for discharge by consultants (if involved): Yes    Discharge Planner Nurse   Safe discharge environment identified: Yes  Barriers to discharge: Yes       Entered by: Soni Collins RN 09/18/2023 12:02 AM

## 2023-09-18 NOTE — PLAN OF CARE
Goal Outcome Evaluation:  Pt is alert & pleasant & able to make needs known. She states her pain is much better controlled at this time. She says the topical ointments are helping significantly with her pain. Pt became lightheaded late morning when she got up to the restroom. She believes it is due to the Gabapentin. The MD discussed changing the Gabapentin dose with the pt. This will be addressed in her discharge meds. Pt's daughter to transport.

## 2023-09-18 NOTE — PROGRESS NOTES
PRIMARY DIAGNOSIS: ACUTE PAIN  OUTPATIENT/OBSERVATION GOALS TO BE MET BEFORE DISCHARGE:  1. Pain Status: Improved-controlled with oral pain medications.    2. Return to near baseline physical activity: No    3. Cleared for discharge by consultants (if involved): No    Discharge Planner Nurse   Safe discharge environment identified: Yes  Barriers to discharge: Yes--needs to work with therapy, further pain control       Entered by: PAULA TORREZ RN 09/17/2023 7:46 PM     Please review provider order for any additional goals.   Nurse to notify provider when observation goals have been met and patient is ready for discharge.

## 2023-09-19 ENCOUNTER — PATIENT OUTREACH (OUTPATIENT)
Dept: CARE COORDINATION | Facility: CLINIC | Age: 88
End: 2023-09-19
Payer: MEDICARE

## 2023-09-19 NOTE — PROGRESS NOTES
[facility]    Background: Transitional Care Management program identified per system criteria and reviewed by [facility] team for possible outreach.    Assessment: Upon chart review, [facility] Team member will not proceed with patient outreach related to this episode of Transitional Care Management program due to reason below:    Patient has discharged to a Memory Care, Long-term Care, Assisted Living or Group Home where patient is receiving on-site support with their daily cares, including support with hospital follow up plan.    Plan: Transitional Care Management episode addressed appropriately per reason noted above.      Justine Martinez  Community Health Worker  [facility]  [phone]      *Connected Care Resource Team does NOT follow patient ongoing. Referrals are identified based on internal discharge reports and the outreach is to ensure patient has an understanding of their discharge instructions.

## 2023-10-31 RX ORDER — TRIAMCINOLONE ACETONIDE 1 MG/G
CREAM TOPICAL 2 TIMES DAILY
Status: ON HOLD | COMMUNITY
End: 2023-11-06

## 2023-10-31 RX ORDER — ALENDRONATE SODIUM 70 MG/1
70 TABLET ORAL
COMMUNITY

## 2023-11-03 NOTE — H&P (VIEW-ONLY)
[facility]      Preoperative Consultation   Kylie Amaya   : 1935   Gender: female    Date of Encounter: 2023    Nursing Notes:   Tameka San  2023 10:03 AM  Sign at exiting of workspace  Kylie Amaya is a 87 y.o. female (1935) who presents for preop evaluation undergoing surgery for treatment of ESOPHAGOGASTRODUODENOSCOPY .    Date of Surgery: 23  Surgical Specialty: Gastroenterology  Christofer Abraham MD - [facility]  Hospital/Surgical Facility: [facility]  Fax number:   Surgery type: outpatient  Primary Physician: Catrina Velasco CMA..........2023 9:07 AM          Kylie Amaya is a 87 y.o. female (1935) who presents for preop evaluation undergoing surgery for treatment of RIGHT TOTAL HIP ARTHROPLASTY -POSTERIOR APPROACH .    Date of Surgery: 23  Surgical Specialty: Orthopedic/Spine       Syd Templeton     [facility] Facility: [facility]  Fax number:   Surgery type: outpatient  Primary Physician: Catrina Velasco See        Tameka San CMA..........2023 9:09 AM         History of Present Illness   Patient comes in for preoperative evaluation today.  Several other issues were discussed.   Review of Systems   A comprehensive review of systems was negative except for items noted in HPI.    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     alendronate (Fosamax) 70 mg tablet Take 1 Tablet (70 mg) by mouth once a week in the morning. Take on empty stomach with full glass of water. Do not lie down for 1 hr.     ASPIRIN EC 81 MG TAB, DELAYED RELEASE one daily     atorvastatin (LIPITOR) 40 mg tablet Take 1 Tablet (40 mg) by mouth at bedtime.     cyanocobalamin (VITAMIN B12) 1,000 mcg/mL injection INJECT 1 ML INTRAMUSCULAR EVERY 4 WEEKS     diclofenac topical (Voltaren) 1 % gel Apply topically to affected area(s) four times daily.     furosemide (LASIX) 20 mg tablet Take 1 Tablet (20 mg) by mouth every morning.     lidocaine 5 % topical gel Prn up to 4 times daily     ondansetron (ZOFRAN ODT) 8 mg disintegrating tablet Place 1 Tablet (8 mg) on the tongue every 8 hours if needed for Nausea/Vomiting.     pantoprazole (PROTONIX) 20 mg tablet Take 1 Tablet (20 mg) by mouth two times daily before meals.     No current facility-administered medications for this visit.     Medications have been reviewed by me and are current to the best of my knowledge and ability.     Allergies   Allergen Reactions     Codeine Nausea Only and Confusion     Most narcotics nauseated and confused and disoriented and vomiting  Other reaction(s): Vomiting/Nausea, Stomach pain     Darvon [Propoxyphene] Nausea Only and Confusion     Propoxyphene Hcl Nausea And Vomiting     Zithromax [Azithromycin] Diarrhea     Bloody diarrhea     Past Surgical History:   . Laterality  "Date     APPENDECTOMY      pt reported     BREAST BIOPSY  ,,    x three     ESOPHAGOGASTRODUODENOSCOPY      Endoscopy, Upper (EGD)     ESOPHAGOGASTRODUODENOSCOPY  08    Eus with Dr. Álvarez     HYSTERECTOMY      DERIK/BSO     Left hip arthroplasty  10/23/2007     OTHER      BONE SPUR LEFT FOOT     CA UNLISTED PROCEDURE HUMERUS/ELBOW      elbow surgery, pt reported     SKIN/SUBCUTANEOUS SURGERY      MOHS/squamous      DERIK AND BSO       TONSIL AND ADENOIDECTOMY       Social History     Tobacco Use     Smoking status: Former     Current packs/day: 0.00     Average packs/day: 0.5 packs/day for 20.0 years (10.0 ttl pk-yrs)     Types: Cigarettes     Start date: 4/3/1957     Quit date: 4/3/1977     Years since quittin.6     Smokeless tobacco: Never   Vaping Use     Vaping Use: Never used   Substance Use Topics     Alcohol use: Yes     Alcohol/week: 3.0 standard drinks of alcohol     Types: 3 Standard drinks or equivalent per week     Comment: 3-4 drinks weekly     Drug use: Never     Family History   Problem Relation Age of Onset     Cancer-colon Father      Heart Disease Mother      Gallbladder disease Mother         stones, causing infections     Heart Disease Sister      Stroke Brother      Cancer-breast Daughter 45        bilat     Cancer-ovarian No Family History      Cancer-prostate No Family History      Cancer No Family History        PAST DIFFICULTY WITH ANESTHESIA: None     Physical Exam   /64 (Cuff Site: Left Arm, Position: Sitting, Cuff Size: Adult Regular)   Pulse 68   Ht 1.575 m (5' 2\")   Wt 45.8 kg (101 lb)   BMI 18.47 kg/m   Body mass index is 18.47 kg/m .  General Appearance: Pleasant, alert, appropriate appearance for age. No acute distress  Head Exam: Normal. Normocephalic, atraumatic.  Eye Exam:  Normal external eye, conjunctiva, lids, cornea. GAV[place].  Fundoscopic Exam: Normal red reflex and fundoscopic exam.  Ear Exam: Normal TM's bilaterally. Normal " auditory canals and external ears. Non-tender.  Nose Exam: Normal external nose, mucous membranes, and septum.  OroPharynx Exam:  Dental hygiene adequate. Normal buccal mucosa. Normal pharynx.  Neck Exam:  Supple, no masses or nodes.  Thyroid Exam: No nodules or enlargement.  Chest/Respiratory Exam: Normal chest wall and respirations. Clear to auscultation.  Cardiovascular Exam: Regular rate and rhythm. S1, S2, no murmur, click, gallop, or rubs.  Gastrointestinal Exam: Soft, non-tender, no masses or organomegaly.  Lymphatic Exam: Non-palpable nodes in neck, clavicular, axillary, or inguinal regions.  Musculoskeletal Exam: Back is straight and non-tender, full ROM of upper and lower extremities.  Foot Exam: Left and right foot: good pedal pulses, no lesions, nail hygiene good.  Skin: no rash or abnormalities  Neurologic Exam: Nonfocal, symmetric DTRs, normal gross motor, tone coordination and no tremor.  Psychiatric Exam: Alert and oriented - appropriate affect.      Assessment / Plan       ICD-10-CM    1. Preop exam for internal medicine  Z01.818       2. Gastroesophageal reflux disease without esophagitis  K21.9 HEMOGLOBIN     PLATELET COUNT      3. Mass of stomach  K31.89       4. Primary hypertension  I10 FL READING EKG - NO CHARGE, COMP ONLY     EKG 12 LEAD     FL ECG ROUTINE ECG W/LEAST 12 LDS W/I&R     POTASSIUM     CREATININE      5. Mixed hyperlipidemia  E78.2       6. Atherosclerosis of native coronary artery of native heart without angina pectoris  I25.10       7. QT prolongation  R94.31       8. Mild aortic stenosis  I35.0       9. Age-related osteoporosis without current pathological fracture  M81.0         1. Preop exam for internal medicine  Patient comes in for preoperative evaluation for procedure noted above.      2. Gastroesophageal reflux disease without esophagitis  Comment: She  has Gastroesophageal reflux disease.  Currently on Proton pump inhibitor Daily. Her  symptoms are under Excellent  control.  Patient does not have alarm symptoms weight loss, dysphagia, black stools, hemetemesis, odynophagia, fever.  Outpatient Current GERD Medications as of 11/3/2023               pantoprazole (PROTONIX) 20 mg tablet Take 1 Tablet (20 mg) by mouth two times daily before meals.          Plan: Currently on medication as above. Continue current medication. Short and long term side effect profile of medications discussed.   She  should alert me if there are persistent symptoms, dysphagia, weight loss or GI bleeding.    Discussed avoiding nonsteroidal anti-inflammatory agents.  Patient is on a GERD diet.  Reinforced diet as below.  GERD-BLAND DIET:  Limit or avoid: chocolate, caffeine, alcohol, pepper, spearmint or peppermint.  Limit or avoid foods high in fat:  fried food, whole milk, high-fat cheese, high-fat desserts, and high-fat meats such as hot dogs, macdonald  and sausage.  Avoid fruits or vegetables that cause your symptoms.     Eat small meals often during the day.  Wait 3 hours before lying down after eating.    - HEMOGLOBIN; Future  - PLATELET COUNT; Future  - HEMOGLOBIN  - PLATELET COUNT    3. Mass of stomach  Incidentally found stomach mass on CT scan done for coronary evaluation.  Patient is undergoing EGD  because of that    4. Primary hypertension  Comment: Patient has hypertension.  Patient's blood pressure is under good control on current medications. Patient reports no side effects.Patient is taking medication regularly as directed.  Home BP Readings:      * No data to display              Clinic readings:  BP Readings from Last 4 Encounters:   11/02/23 122/64   10/27/23 (!) 82/62   10/24/23 127/60   09/27/23 160/72     Patient is compliant with low salt diet, exercises and medications.  Outpatient Current Antihypertensives as of 11/3/2023               furosemide (LASIX) 20 mg tablet Take 1 Tablet (20 mg) by mouth every morning.          Plan:   Patient's blood pressure is under good control on  current medications.  Patient is taking medication regularly.   Continue current medications.   Discussed weight loss and low sodium diet.   Follow-up in 3 months.    - MN READING EKG - NO CHARGE, COMP ONLY  - EKG 12 LEAD; Future  - MN ECG ROUTINE ECG W/LEAST 12 LDS W/I&R  - POTASSIUM; Future  - CREATININE; Future  - POTASSIUM  - CREATININE    5. Mixed hyperlipidemia  Comment: Cardiac Risk:  The ASCVD Risk score (Demarco SIFUENTES, et al., 2019) failed to calculate for the following reasons:    The 2019 ASCVD risk score is only valid for ages 40 to 79  cardiacrisklevelht: High (ASCVD 10 yr >20% or CAC score >100 or Familial Heterozygous Hypercholesterolemia LDL>190)  She is on a statin medication.  Outpatient Current Lipid Lowering Medications as of 11/3/2023               atorvastatin (LIPITOR) 40 mg tablet Take 1 Tablet (40 mg) by mouth at bedtime.          Last Lipids:  Recent Labs     05/23/23  1057 11/11/22  1220 04/15/22  0915 11/05/21  1133 10/30/20  1127 10/29/19  1041   CHOL  --  208 H  --  267 H   < > 220 H   TRIGLYCERIDE  --  58  --  73   < > 68   HDL  --  109  --  143   < > 109   NONHDLCHOL  --  99  --  124   < > 111   CHOLHDLRATIO  --  1.91  --  1.87   < > 2.02   LDLCHOL  --  87  --  109   < > 97   LDLDIRECT 98  --   --   --   --   --    AST  --  20   < > 22   < > 20   ALT 15 12   < > 16   < > 11   CK  --   --   --   --   --  60    < > = values in this interval not displayed.     Recent Labs     05/23/23  1057 11/11/22  1220 11/05/21  1133 10/30/20  1127   LDLCHOL  --  87 109 145 H   LDLDIRECT 98  --   --   --      LDL target:  CVLipidTargetAnalysisht: LDL goal is under 70  Patient  does not report any symptoms or side effects from medication.    Denies any muscle aches or rashes.  Plan: Patient's lipids are Controlled on current medications.  She was advised to Continue current therapy.   We will recheck labs  in 6 months.         6. Atherosclerosis of native coronary artery of native heart without angina  pectoris  Comment: Patient has previous coronary artery disease.  Outpatient Current Cardiovascular Medications as of 11/3/2023               ASPIRIN EC 81 MG TAB, DELAYED RELEASE one daily    atorvastatin (LIPITOR) 40 mg tablet Take 1 Tablet (40 mg) by mouth at bedtime.    furosemide (LASIX) 20 mg tablet Take 1 Tablet (20 mg) by mouth every morning.           Ischemic assessment:  No results found for this or any previous visit.    ECHO:No results found for this or any previous visit.    Patient denies any exertional chest pain, dyspnea, palpitations, syncope, orthopnea, edema or paroxysmal nocturnal dyspnea.  Patient is on secondary risk factor modification for coronary artery disease with aspirin, statin, ACEI/ARB and beta blocker.  Plan: Stable in terms of coronary artery disease.  Advised patient to continue secondary risk factor modification for coronary artery disease with aspirin, statin, ACEI / ARB and beta blocker as per individual requirement and tolerance.      7. QT prolongation  EKG: Normal sinus rhythm  normal axis, normal intervals, no acute ST/T changes c/w ischemia  no LVH by voltage criteria  nonspecific intraventricular conduction delay  unchanged from previous tracings        8. Mild aortic stenosis  Comment: Patient has moderate aortic stenosis on echocardiogram.  Her exercise tolerance is reasonable.  She  does not have any angina, syncope or dyspnea on exertion.Shedoes not have any rhythm problems.  Lab/Imaging   No results found for this or any previous visit from the past 365 days.     No results found for this or any previous visit from the past 365 days.         Plan: Continue to monitor with serial electrocardiograms. If patient has progression of symptoms, we will evaluate for appropriate valve intervention which could include TAVR or valve replacement.  .echo      9. Age-related osteoporosis without current pathological fracture  Comment: Patient has osteoporosis.  Patient is currently  on treatment with:Fosamax.  has a current medication list which includes the following prescription(s): acetaminophen, alendronate, aspirin ec, atorvastatin, cyanocobalamin, diclofenac topical, furosemide, lidocaine, ondansetron, and pantoprazole.   There is no history of recent pathological fracture.  [facility]  Imaging Results   Objective  Results for orders placed in visit on 11/16/21    XR DXA BONE DENSITY 2 SITES AXIAL    Narrative  For Patients: As a result of the 21st Century Cures Act, medical imaging exams and procedure reports are released immediately into your electronic medical record. You may view this report before your referring provider. If you have questions, please contact your health care provider.    EXAM: XR DXA BONE DENSITY 2 SITES AXIAL  LOCATION: [facility]  DATE/TIME: 11/16/2021 4:37 PM    INDICATION: D. osteoporosis (must be documented by previous exam) - m81.0 Menopause  COMPARISON: 10/22/2018  TECHNIQUE: Dual-energy x-ray absorptiometry performed with routine technique.    FINDINGS:    Lumbar Spine: L1-L4: BMD: 1.013 g/cm2. T-score: -1.4. Z-score: 0.5  RIGHT Hip Total: BMD: 0.690 g/cm2. T-score: -2.5. Z-score: -0.3  RIGHT Hip Femoral neck: BMD: 0.685 g/cm2. T-score: -2.5. Z-score: -0.2      WHO Criteria:  Normal: T score at or above -1 SD  Osteopenia: T score between -1 and -2.5 SD  Osteoporosis: T score at or below -2.5 SD    COMPARISON: There has been a 7.2 % increase in lumbar spine bone mineral density since 10/22/2018 and a 6.6% decrease in right hip density.    Impression  OSTEOPOROSIS. T score meets the World Health Organization (WHO) criteria for osteoporosis at one or more measured sites. The risk of osteoporotic fracture increased approximately two-fold for each SD decrease in T-score.    No results found for this or any previous visit.         Plan: Continue vitamin D, calcium and disease modifying medication.   Recheck DEXA scan as  per guidelines.      Labs and Imaging   Labs: yes  Orders Placed This Encounter      HEMOGLOBIN      POTASSIUM      CREATININE      PLATELET COUNT      EKG 12 LEAD      MO ECG ROUTINE ECG W/LEAST 12 LDS W/I&R     No visits with results within 1 Week(s) from this visit.   Latest known visit with results is:   Hospital Outpatient Visit on 10/24/2023   Component Date Value     CREATININE, POCT 10/24/2023 1.00      eGFR 10/24/2023 55 (L)       Results for orders placed or performed in visit on 11/02/23   HEMOGLOBIN   Result Value Ref Range Status    HEMOGLOBIN                14.5 12.0 - 16.0 g/dL Final    MCV                       97 80 - 100 fL Final   POTASSIUM   Result Value Ref Range Status    POTASSIUM 3.8 3.5 - 5.1 mmol/L Final   CREATININE   Result Value Ref Range Status    eGFR 88 (L) >90 mL/min/1.73m2 Final    CREATININE 0.59 0.50 - 0.90 mg/dL Final   PLATELET COUNT   Result Value Ref Range Status    PLATELET COUNT            376 140 - 440 thou/cu mm Final    MPV                       8.8 6.5 - 11.0 fL Final        ECG: yes  EKG: Normal sinus rhythm  normal axis, normal intervals, no acute ST/T changes c/w ischemia  no LVH by voltage criteria  unchanged from previous tracings  EKG was personally reviewed by me.   Visit Data   Start Time:10:05 AM   End Time:10:41 AM  Total time preparing to see this patient, face-to-face time, and coordinating care time on the same calendar date: 30 minutes.   During the visit today, I reviewed previous and current labs, notes and other pertinent imaging.  Additional time was spent in precharting and post charting after the visit.  This is reflected in the total time spent for this visit.    Preoperative Clearance and instructions     The Pre-Op Tool    Recommendations      Low Risk Procedure    Cardiac History  History of Moderate aortic stenosis.  History of asymptomatic CAD, detected by CT Calcium Score           Labs  HGB within last 30 days  PLT within last 30  days  Potassium within last 30 days  Creatinine within last 30 days  EKG  Baseline EKG within the last 12 months  Stress Testing  Not indicated    * Testing recommendations are intended to assist, but not direct, clinical decisions.           Hold  on the morning of procedure.  Take your other medications as usual prior to the procedure  Hold vitamins and/or supplements for 1 week prior to the procedure  Hold fish oil for 2 weeks prior to the procedure  Okay to take Acetaminophen (Tylenol) up until the procedure  Hold / avoid NSAIDs (e.g. ibuprofen, naproxen) prior to procedure: 2 days for ibuprofen (Advil) and 4 days for naproxen (Aleve).    * Medication recommendations are not intended to be exhaustive; they are limited to common medications that are potentially dangerous if incorrectly managed          Labs  * Data supports elimination of  routine  laboratory testing in favor of focused,  indicated  testing based on medical co-morbidities. A 2009 study randomized 1061 patients undergoing ambulatory, non-cataract surgery to routine or to indicated testing. Perioperative adverse events were similar (Anesthesia & Analgesia 2009;108:467-75; Anesthesiol. Clin. 2016 Mar;34(1):43-58).  EKG  * The ACC/AHA recommends against obtaining routine EKGs in patients undergoing low risk surgeries, a class IIa recommendation (JACC. 2014;64(21);e1-76).     Session ID: [id number]  Endnotes and bibliography available upon request: [email or web address]  Patient is cleared, pending tests ordered today, for planned procedure.   Electronically Signed by:   Pascual Bill MD  11/2/2023        *Some images could not be shown.

## 2023-11-03 NOTE — H&P (VIEW-ONLY)
[facility]      Preoperative Consultation   Kylie Amaya   : 1935   Gender: female    Date of Encounter: 2023    Nursing Notes:   Tameka San  2023 10:03 AM  Sign at exiting of workspace  Kylie Amaya is a 87 y.o. female (1935) who presents for preop evaluation undergoing surgery for treatment of ESOPHAGOGASTRODUODENOSCOPY .    Date of Surgery: 23  Surgical Specialty: Gastroenterology  Christofer Abraham MD - [facility]  Hospital/Surgical Facility: [facility]  Fax number:   Surgery type: outpatient  Primary Physician: Catrina Velasco CMA..........2023 9:07 AM          Kylie Amaya is a 87 y.o. female (1935) who presents for preop evaluation undergoing surgery for treatment of RIGHT TOTAL HIP ARTHROPLASTY -POSTERIOR APPROACH .    Date of Surgery: 23  Surgical Specialty: Orthopedic/Spine       Syd Templeton     [facility] Facility: [facility]  Fax number:   Surgery type: outpatient  Primary Physician: Catrina Velasco See        Tameka San CMA..........2023 9:09 AM         History of Present Illness   Patient comes in for preoperative evaluation today.  Several other issues were discussed.   Review of Systems   A comprehensive review of systems was negative except for items noted in HPI.    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     alendronate (Fosamax) 70 mg tablet Take 1 Tablet (70 mg) by mouth once a week in the morning. Take on empty stomach with full glass of water. Do not lie down for 1 hr.     ASPIRIN EC 81 MG TAB, DELAYED RELEASE one daily     atorvastatin (LIPITOR) 40 mg tablet Take 1 Tablet (40 mg) by mouth at bedtime.     cyanocobalamin (VITAMIN B12) 1,000 mcg/mL injection INJECT 1 ML INTRAMUSCULAR EVERY 4 WEEKS     diclofenac topical (Voltaren) 1 % gel Apply topically to affected area(s) four times daily.     furosemide (LASIX) 20 mg tablet Take 1 Tablet (20 mg) by mouth every morning.     lidocaine 5 % topical gel Prn up to 4 times daily     ondansetron (ZOFRAN ODT) 8 mg disintegrating tablet Place 1 Tablet (8 mg) on the tongue every 8 hours if needed for Nausea/Vomiting.     pantoprazole (PROTONIX) 20 mg tablet Take 1 Tablet (20 mg) by mouth two times daily before meals.     No current facility-administered medications for this visit.     Medications have been reviewed by me and are current to the best of my knowledge and ability.     Allergies   Allergen Reactions     Codeine Nausea Only and Confusion     Most narcotics nauseated and confused and disoriented and vomiting  Other reaction(s): Vomiting/Nausea, Stomach pain     Darvon [Propoxyphene] Nausea Only and Confusion     Propoxyphene Hcl Nausea And Vomiting     Zithromax [Azithromycin] Diarrhea     Bloody diarrhea     Past Surgical History:   . Laterality  "Date     APPENDECTOMY      pt reported     BREAST BIOPSY  ,,    x three     ESOPHAGOGASTRODUODENOSCOPY      Endoscopy, Upper (EGD)     ESOPHAGOGASTRODUODENOSCOPY  08    [facility] with Dr. Álvarez     HYSTERECTOMY      DERIK/BSO     Left hip arthroplasty  10/23/2007     OTHER      BONE SPUR LEFT FOOT     NC UNLISTED PROCEDURE HUMERUS/ELBOW      elbow surgery, pt reported     SKIN/SUBCUTANEOUS SURGERY      MOHS/squamous      DERIK AND BSO       TONSIL AND ADENOIDECTOMY       Social History     Tobacco Use     Smoking status: Former     Current packs/day: 0.00     Average packs/day: 0.5 packs/day for 20.0 years (10.0 ttl pk-yrs)     Types: Cigarettes     Start date: 4/3/1957     Quit date: 4/3/1977     Years since quittin.6     Smokeless tobacco: Never   Vaping Use     Vaping Use: Never used   Substance Use Topics     Alcohol use: Yes     Alcohol/week: 3.0 standard drinks of alcohol     Types: 3 Standard drinks or equivalent per week     Comment: 3-4 drinks weekly     Drug use: Never     Family History   Problem Relation Age of Onset     Cancer-colon Father      Heart Disease Mother      Gallbladder disease Mother         stones, causing infections     Heart Disease Sister      Stroke Brother      Cancer-breast Daughter 45        bilat     Cancer-ovarian No Family History      Cancer-prostate No Family History      Cancer No Family History        PAST DIFFICULTY WITH ANESTHESIA: None     Physical Exam   /64 (Cuff Site: Left Arm, Position: Sitting, Cuff Size: Adult Regular)   Pulse 68   Ht 1.575 m (5' 2\")   Wt 45.8 kg (101 lb)   BMI 18.47 kg/m   Body mass index is 18.47 kg/m .  General Appearance: Pleasant, alert, appropriate appearance for age. No acute distress  Head Exam: Normal. Normocephalic, atraumatic.  Eye Exam:  Normal external eye, conjunctiva, lids, cornea. [place]AV[place].  Fundoscopic Exam: Normal red reflex and fundoscopic exam.  Ear Exam: Normal TM's bilaterally. Normal " auditory canals and external ears. Non-tender.  Nose Exam: Normal external nose, mucous membranes, and septum.  OroPharynx Exam:  Dental hygiene adequate. Normal buccal mucosa. Normal pharynx.  Neck Exam:  Supple, no masses or nodes.  Thyroid Exam: No nodules or enlargement.  Chest/Respiratory Exam: Normal chest wall and respirations. Clear to auscultation.  Cardiovascular Exam: Regular rate and rhythm. S1, S2, no murmur, click, gallop, or rubs.  Gastrointestinal Exam: Soft, non-tender, no masses or organomegaly.  Lymphatic Exam: Non-palpable nodes in neck, clavicular, axillary, or inguinal regions.  Musculoskeletal Exam: Back is straight and non-tender, full ROM of upper and lower extremities.  Foot Exam: Left and right foot: good pedal pulses, no lesions, nail hygiene good.  Skin: no rash or abnormalities  Neurologic Exam: Nonfocal, symmetric DTRs, normal gross motor, tone coordination and no tremor.  Psychiatric Exam: Alert and oriented - appropriate affect.      Assessment / Plan       ICD-10-CM    1. Preop exam for internal medicine  Z01.818       2. Gastroesophageal reflux disease without esophagitis  K21.9 HEMOGLOBIN     PLATELET COUNT      3. Mass of stomach  K31.89       4. Primary hypertension  I10 AL READING EKG - NO CHARGE, COMP ONLY     EKG 12 LEAD     AL ECG ROUTINE ECG W/LEAST 12 LDS W/I&R     POTASSIUM     CREATININE      5. Mixed hyperlipidemia  E78.2       6. Atherosclerosis of native coronary artery of native heart without angina pectoris  I25.10       7. QT prolongation  R94.31       8. Mild aortic stenosis  I35.0       9. Age-related osteoporosis without current pathological fracture  M81.0         1. Preop exam for internal medicine  Patient comes in for preoperative evaluation for procedure noted above.      2. Gastroesophageal reflux disease without esophagitis  Comment: She  has Gastroesophageal reflux disease.  Currently on Proton pump inhibitor Daily. Her  symptoms are under Excellent  control.  Patient does not have alarm symptoms weight loss, dysphagia, black stools, hemetemesis, odynophagia, fever.  Outpatient Current GERD Medications as of 11/3/2023               pantoprazole (PROTONIX) 20 mg tablet Take 1 Tablet (20 mg) by mouth two times daily before meals.          Plan: Currently on medication as above. Continue current medication. Short and long term side effect profile of medications discussed.   She  should alert me if there are persistent symptoms, dysphagia, weight loss or GI bleeding.    Discussed avoiding nonsteroidal anti-inflammatory agents.  Patient is on a GERD diet.  Reinforced diet as below.  GERD-BLAND DIET:  Limit or avoid: chocolate, caffeine, alcohol, pepper, spearmint or peppermint.  Limit or avoid foods high in fat:  fried food, whole milk, high-fat cheese, high-fat desserts, and high-fat meats such as hot dogs, macdonald  and sausage.  Avoid fruits or vegetables that cause your symptoms.     Eat small meals often during the day.  Wait 3 hours before lying down after eating.    - HEMOGLOBIN; Future  - PLATELET COUNT; Future  - HEMOGLOBIN  - PLATELET COUNT    3. Mass of stomach  Incidentally found stomach mass on CT scan done for coronary evaluation.  Patient is undergoing EGD  because of that    4. Primary hypertension  Comment: Patient has hypertension.  Patient's blood pressure is under good control on current medications. Patient reports no side effects.Patient is taking medication regularly as directed.  Home BP Readings:      * No data to display              Clinic readings:  BP Readings from Last 4 Encounters:   11/02/23 122/64   10/27/23 (!) 82/62   10/24/23 127/60   09/27/23 160/72     Patient is compliant with low salt diet, exercises and medications.  Outpatient Current Antihypertensives as of 11/3/2023               furosemide (LASIX) 20 mg tablet Take 1 Tablet (20 mg) by mouth every morning.          Plan:   Patient's blood pressure is under good control on  current medications.  Patient is taking medication regularly.   Continue current medications.   Discussed weight loss and low sodium diet.   Follow-up in 3 months.    - VT READING EKG - NO CHARGE, COMP ONLY  - EKG 12 LEAD; Future  - VT ECG ROUTINE ECG W/LEAST 12 LDS W/I&R  - POTASSIUM; Future  - CREATININE; Future  - POTASSIUM  - CREATININE    5. Mixed hyperlipidemia  Comment: Cardiac Risk:  The ASCVD Risk score (Demarco SIFUENTES, et al., 2019) failed to calculate for the following reasons:    The 2019 ASCVD risk score is only valid for ages 40 to 79  cardiacrisklevelht: High (ASCVD 10 yr >20% or CAC score >100 or Familial Heterozygous Hypercholesterolemia LDL>190)  She is on a statin medication.  Outpatient Current Lipid Lowering Medications as of 11/3/2023               atorvastatin (LIPITOR) 40 mg tablet Take 1 Tablet (40 mg) by mouth at bedtime.          Last Lipids:  Recent Labs     05/23/23  1057 11/11/22  1220 04/15/22  0915 11/05/21  1133 10/30/20  1127 10/29/19  1041   CHOL  --  208 H  --  267 H   < > 220 H   TRIGLYCERIDE  --  58  --  73   < > 68   HDL  --  109  --  143   < > 109   NONHDLCHOL  --  99  --  124   < > 111   CHOLHDLRATIO  --  1.91  --  1.87   < > 2.02   LDLCHOL  --  87  --  109   < > 97   LDLDIRECT 98  --   --   --   --   --    AST  --  20   < > 22   < > 20   ALT 15 12   < > 16   < > 11   CK  --   --   --   --   --  60    < > = values in this interval not displayed.     Recent Labs     05/23/23  1057 11/11/22  1220 11/05/21  1133 10/30/20  1127   LDLCHOL  --  87 109 145 H   LDLDIRECT 98  --   --   --      LDL target:  CVLipidTargetAnalysisht: LDL goal is under 70  Patient  does not report any symptoms or side effects from medication.    Denies any muscle aches or rashes.  Plan: Patient's lipids are Controlled on current medications.  She was advised to Continue current therapy.   We will recheck labs  in 6 months.         6. Atherosclerosis of native coronary artery of native heart without angina  pectoris  Comment: Patient has previous coronary artery disease.  Outpatient Current Cardiovascular Medications as of 11/3/2023               ASPIRIN EC 81 MG TAB, DELAYED RELEASE one daily    atorvastatin (LIPITOR) 40 mg tablet Take 1 Tablet (40 mg) by mouth at bedtime.    furosemide (LASIX) 20 mg tablet Take 1 Tablet (20 mg) by mouth every morning.           Ischemic assessment:  No results found for this or any previous visit.    ECHO:No results found for this or any previous visit.    Patient denies any exertional chest pain, dyspnea, palpitations, syncope, orthopnea, edema or paroxysmal nocturnal dyspnea.  Patient is on secondary risk factor modification for coronary artery disease with aspirin, statin, ACEI/ARB and beta blocker.  Plan: Stable in terms of coronary artery disease.  Advised patient to continue secondary risk factor modification for coronary artery disease with aspirin, statin, ACEI / ARB and beta blocker as per individual requirement and tolerance.      7. QT prolongation  EKG: Normal sinus rhythm  normal axis, normal intervals, no acute ST/T changes c/w ischemia  no LVH by voltage criteria  nonspecific intraventricular conduction delay  unchanged from previous tracings        8. Mild aortic stenosis  Comment: Patient has moderate aortic stenosis on echocardiogram.  Her exercise tolerance is reasonable.  She  does not have any angina, syncope or dyspnea on exertion.Shedoes not have any rhythm problems.  Lab/Imaging   No results found for this or any previous visit from the past 365 days.     No results found for this or any previous visit from the past 365 days.         Plan: Continue to monitor with serial electrocardiograms. If patient has progression of symptoms, we will evaluate for appropriate valve intervention which could include TAVR or valve replacement.  .echo      9. Age-related osteoporosis without current pathological fracture  Comment: Patient has osteoporosis.  Patient is currently  on treatment with:Fosamax.  has a current medication list which includes the following prescription(s): acetaminophen, alendronate, aspirin ec, atorvastatin, cyanocobalamin, diclofenac topical, furosemide, lidocaine, ondansetron, and pantoprazole.   There is no history of recent pathological fracture.  [facility]  Imaging Results   Objective  Results for orders placed in visit on 11/16/21    XR DXA BONE DENSITY 2 SITES AXIAL    Narrative  For Patients: As a result of the 21st Century Cures Act, medical imaging exams and procedure reports are released immediately into your electronic medical record. You may view this report before your referring provider. If you have questions, please contact your health care provider.    EXAM: XR DXA BONE DENSITY 2 SITES AXIAL  LOCATION: [facility]  DATE/TIME: 11/16/2021 4:37 PM    INDICATION: D. osteoporosis (must be documented by previous exam) - m81.0 Menopause  COMPARISON: 10/22/2018  TECHNIQUE: Dual-energy x-ray absorptiometry performed with routine technique.    FINDINGS:    Lumbar Spine: L1-L4: BMD: 1.013 g/cm2. T-score: -1.4. Z-score: 0.5  RIGHT Hip Total: BMD: 0.690 g/cm2. T-score: -2.5. Z-score: -0.3  RIGHT Hip Femoral neck: BMD: 0.685 g/cm2. T-score: -2.5. Z-score: -0.2      WHO Criteria:  Normal: T score at or above -1 SD  Osteopenia: T score between -1 and -2.5 SD  Osteoporosis: T score at or below -2.5 SD    COMPARISON: There has been a 7.2 % increase in lumbar spine bone mineral density since 10/22/2018 and a 6.6% decrease in right hip density.    Impression  OSTEOPOROSIS. T score meets the World Health Organization (WHO) criteria for osteoporosis at one or more measured sites. The risk of osteoporotic fracture increased approximately two-fold for each SD decrease in T-score.    No results found for this or any previous visit.         Plan: Continue vitamin D, calcium and disease modifying medication.   Recheck DEXA scan as  per guidelines.      Labs and Imaging   Labs: yes  Orders Placed This Encounter      HEMOGLOBIN      POTASSIUM      CREATININE      PLATELET COUNT      EKG 12 LEAD      [place] ECG ROUTINE ECG W/LEAST 12 LDS W/I&R     No visits with results within 1 Week(s) from this visit.   Latest known visit with results is:   Hospital Outpatient Visit on 10/24/2023   Component Date Value     CREATININE, POCT 10/24/2023 1.00      eGFR 10/24/2023 55 (L)       Results for orders placed or performed in visit on 11/02/23   HEMOGLOBIN   Result Value Ref Range Status    HEMOGLOBIN                14.5 12.0 - 16.0 g/dL Final    MCV                       97 80 - 100 fL Final   POTASSIUM   Result Value Ref Range Status    POTASSIUM 3.8 3.5 - 5.1 mmol/L Final   CREATININE   Result Value Ref Range Status    eGFR 88 (L) >90 mL/min/1.73m2 Final    CREATININE 0.59 0.50 - 0.90 mg/dL Final   PLATELET COUNT   Result Value Ref Range Status    PLATELET COUNT            376 140 - 440 thou/cu mm Final    MPV                       8.8 6.5 - 11.0 fL Final        ECG: yes  EKG: Normal sinus rhythm  normal axis, normal intervals, no acute ST/T changes c/w ischemia  no LVH by voltage criteria  unchanged from previous tracings  EKG was personally reviewed by me.   Visit Data   Start Time:10:05 AM   End Time:10:41 AM  Total time preparing to see this patient, face-to-face time, and coordinating care time on the same calendar date: 30 minutes.   During the visit today, I reviewed previous and current labs, notes and other pertinent imaging.  Additional time was spent in precharting and post charting after the visit.  This is reflected in the total time spent for this visit.    Preoperative Clearance and instructions     The Pre-Op Tool    Recommendations      Low Risk Procedure    Cardiac History  History of Moderate aortic stenosis.  History of asymptomatic CAD, detected by CT Calcium Score           Labs  HGB within last 30 days  PLT within last 30  days  Potassium within last 30 days  Creatinine within last 30 days  EKG  Baseline EKG within the last 12 months  Stress Testing  Not indicated    * Testing recommendations are intended to assist, but not direct, clinical decisions.           Hold  on the morning of procedure.  Take your other medications as usual prior to the procedure  Hold vitamins and/or supplements for 1 week prior to the procedure  Hold fish oil for 2 weeks prior to the procedure  Okay to take Acetaminophen (Tylenol) up until the procedure  Hold / avoid NSAIDs (e.g. ibuprofen, naproxen) prior to procedure: 2 days for ibuprofen (Advil) and 4 days for naproxen (Aleve).    * Medication recommendations are not intended to be exhaustive; they are limited to common medications that are potentially dangerous if incorrectly managed          Labs  * Data supports elimination of  routine  laboratory testing in favor of focused,  indicated  testing based on medical co-morbidities. A 2009 study randomized 1061 patients undergoing ambulatory, non-cataract surgery to routine or to indicated testing. Perioperative adverse events were similar (Anesthesia & Analgesia 2009;108:467-75; Anesthesiol. Clin. 2016 Mar;34(1):43-58).  EKG  * The ACC/AHA recommends against obtaining routine EKGs in patients undergoing low risk surgeries, a class IIa recommendation (JACC. 2014;64(21);e1-76).     Session ID: [id number]  Endnotes and bibliography available upon request: [email or web address]  Patient is cleared, pending tests ordered today, for planned procedure.   Electronically Signed by:   Pascual Bill MD  11/2/2023        *Some images could not be shown.

## 2023-11-06 ENCOUNTER — ANESTHESIA EVENT (OUTPATIENT)
Dept: SURGERY | Facility: CLINIC | Age: 88
End: 2023-11-06
Payer: MEDICARE

## 2023-11-06 ENCOUNTER — HOSPITAL ENCOUNTER (OUTPATIENT)
Facility: CLINIC | Age: 88
Discharge: HOME | End: 2023-11-06
Attending: INTERNAL MEDICINE | Admitting: INTERNAL MEDICINE
Payer: MEDICARE

## 2023-11-06 ENCOUNTER — ANESTHESIA (OUTPATIENT)
Dept: SURGERY | Facility: CLINIC | Age: 88
End: 2023-11-06
Payer: MEDICARE

## 2023-11-06 VITALS
WEIGHT: 100 LBS | OXYGEN SATURATION: 97 % | BODY MASS INDEX: 19.63 KG/M2 | HEART RATE: 76 BPM | SYSTOLIC BLOOD PRESSURE: 175 MMHG | DIASTOLIC BLOOD PRESSURE: 82 MMHG | HEIGHT: 60 IN | TEMPERATURE: 98.5 F | RESPIRATION RATE: 16 BRPM

## 2023-11-06 LAB — UPPER GI ENDOSCOPY: NORMAL

## 2023-11-06 PROCEDURE — 370N000017 HC ANESTHESIA TECHNICAL FEE, PER MIN: Performed by: INTERNAL MEDICINE

## 2023-11-06 PROCEDURE — 88342 IMHCHEM/IMCYTCHM 1ST ANTB: CPT | Mod: TC | Performed by: INTERNAL MEDICINE

## 2023-11-06 PROCEDURE — 250N000009 HC RX 250

## 2023-11-06 PROCEDURE — 360N000075 HC SURGERY LEVEL 2, PER MIN: Performed by: INTERNAL MEDICINE

## 2023-11-06 PROCEDURE — 258N000003 HC RX IP 258 OP 636: Performed by: ANESTHESIOLOGY

## 2023-11-06 PROCEDURE — 710N000012 HC RECOVERY PHASE 2, PER MINUTE: Performed by: INTERNAL MEDICINE

## 2023-11-06 PROCEDURE — 272N000001 HC OR GENERAL SUPPLY STERILE: Performed by: INTERNAL MEDICINE

## 2023-11-06 PROCEDURE — 250N000011 HC RX IP 250 OP 636: Mod: JZ

## 2023-11-06 PROCEDURE — 999N000141 HC STATISTIC PRE-PROCEDURE NURSING ASSESSMENT: Performed by: INTERNAL MEDICINE

## 2023-11-06 PROCEDURE — 258N000003 HC RX IP 258 OP 636

## 2023-11-06 RX ORDER — ONDANSETRON 2 MG/ML
4 INJECTION INTRAMUSCULAR; INTRAVENOUS EVERY 30 MIN PRN
Status: DISCONTINUED | OUTPATIENT
Start: 2023-11-06 | End: 2023-11-06 | Stop reason: HOSPADM

## 2023-11-06 RX ORDER — SODIUM CHLORIDE, SODIUM LACTATE, POTASSIUM CHLORIDE, CALCIUM CHLORIDE 600; 310; 30; 20 MG/100ML; MG/100ML; MG/100ML; MG/100ML
INJECTION, SOLUTION INTRAVENOUS CONTINUOUS
Status: DISCONTINUED | OUTPATIENT
Start: 2023-11-06 | End: 2023-11-06 | Stop reason: HOSPADM

## 2023-11-06 RX ORDER — ONDANSETRON 2 MG/ML
INJECTION INTRAMUSCULAR; INTRAVENOUS PRN
Status: DISCONTINUED | OUTPATIENT
Start: 2023-11-06 | End: 2023-11-06

## 2023-11-06 RX ORDER — NALOXONE HYDROCHLORIDE 0.4 MG/ML
0.2 INJECTION, SOLUTION INTRAMUSCULAR; INTRAVENOUS; SUBCUTANEOUS
Status: DISCONTINUED | OUTPATIENT
Start: 2023-11-06 | End: 2023-11-06 | Stop reason: HOSPADM

## 2023-11-06 RX ORDER — PROPOFOL 10 MG/ML
INJECTION, EMULSION INTRAVENOUS PRN
Status: DISCONTINUED | OUTPATIENT
Start: 2023-11-06 | End: 2023-11-06

## 2023-11-06 RX ORDER — PROPOFOL 10 MG/ML
INJECTION, EMULSION INTRAVENOUS CONTINUOUS PRN
Status: DISCONTINUED | OUTPATIENT
Start: 2023-11-06 | End: 2023-11-06

## 2023-11-06 RX ORDER — FENTANYL CITRATE 50 UG/ML
25 INJECTION, SOLUTION INTRAMUSCULAR; INTRAVENOUS EVERY 5 MIN PRN
Status: DISCONTINUED | OUTPATIENT
Start: 2023-11-06 | End: 2023-11-06 | Stop reason: HOSPADM

## 2023-11-06 RX ORDER — LIDOCAINE 40 MG/G
CREAM TOPICAL
Status: DISCONTINUED | OUTPATIENT
Start: 2023-11-06 | End: 2023-11-06 | Stop reason: HOSPADM

## 2023-11-06 RX ORDER — ONDANSETRON 4 MG/1
4 TABLET, ORALLY DISINTEGRATING ORAL EVERY 6 HOURS PRN
Status: DISCONTINUED | OUTPATIENT
Start: 2023-11-06 | End: 2023-11-06 | Stop reason: HOSPADM

## 2023-11-06 RX ORDER — HYDROMORPHONE HCL IN WATER/PF 6 MG/30 ML
0.2 PATIENT CONTROLLED ANALGESIA SYRINGE INTRAVENOUS EVERY 5 MIN PRN
Status: DISCONTINUED | OUTPATIENT
Start: 2023-11-06 | End: 2023-11-06 | Stop reason: HOSPADM

## 2023-11-06 RX ORDER — LIDOCAINE HYDROCHLORIDE 10 MG/ML
INJECTION, SOLUTION INFILTRATION; PERINEURAL PRN
Status: DISCONTINUED | OUTPATIENT
Start: 2023-11-06 | End: 2023-11-06

## 2023-11-06 RX ORDER — ONDANSETRON 4 MG/1
4 TABLET, ORALLY DISINTEGRATING ORAL EVERY 30 MIN PRN
Status: DISCONTINUED | OUTPATIENT
Start: 2023-11-06 | End: 2023-11-06 | Stop reason: HOSPADM

## 2023-11-06 RX ORDER — ONDANSETRON 2 MG/ML
4 INJECTION INTRAMUSCULAR; INTRAVENOUS EVERY 6 HOURS PRN
Status: DISCONTINUED | OUTPATIENT
Start: 2023-11-06 | End: 2023-11-06 | Stop reason: HOSPADM

## 2023-11-06 RX ORDER — FENTANYL CITRATE 50 UG/ML
50 INJECTION, SOLUTION INTRAMUSCULAR; INTRAVENOUS EVERY 5 MIN PRN
Status: DISCONTINUED | OUTPATIENT
Start: 2023-11-06 | End: 2023-11-06 | Stop reason: HOSPADM

## 2023-11-06 RX ORDER — NALOXONE HYDROCHLORIDE 0.4 MG/ML
0.4 INJECTION, SOLUTION INTRAMUSCULAR; INTRAVENOUS; SUBCUTANEOUS
Status: DISCONTINUED | OUTPATIENT
Start: 2023-11-06 | End: 2023-11-06 | Stop reason: HOSPADM

## 2023-11-06 RX ORDER — PROCHLORPERAZINE MALEATE 5 MG
5 TABLET ORAL EVERY 6 HOURS PRN
Status: DISCONTINUED | OUTPATIENT
Start: 2023-11-06 | End: 2023-11-06 | Stop reason: HOSPADM

## 2023-11-06 RX ORDER — HYDROMORPHONE HCL IN WATER/PF 6 MG/30 ML
0.4 PATIENT CONTROLLED ANALGESIA SYRINGE INTRAVENOUS EVERY 5 MIN PRN
Status: DISCONTINUED | OUTPATIENT
Start: 2023-11-06 | End: 2023-11-06 | Stop reason: HOSPADM

## 2023-11-06 RX ORDER — OXYCODONE HYDROCHLORIDE 5 MG/1
5 TABLET ORAL
Status: DISCONTINUED | OUTPATIENT
Start: 2023-11-06 | End: 2023-11-06 | Stop reason: HOSPADM

## 2023-11-06 RX ORDER — SODIUM CHLORIDE, SODIUM LACTATE, POTASSIUM CHLORIDE, CALCIUM CHLORIDE 600; 310; 30; 20 MG/100ML; MG/100ML; MG/100ML; MG/100ML
INJECTION, SOLUTION INTRAVENOUS CONTINUOUS PRN
Status: DISCONTINUED | OUTPATIENT
Start: 2023-11-06 | End: 2023-11-06

## 2023-11-06 RX ORDER — OXYCODONE HYDROCHLORIDE 5 MG/1
10 TABLET ORAL
Status: DISCONTINUED | OUTPATIENT
Start: 2023-11-06 | End: 2023-11-06 | Stop reason: HOSPADM

## 2023-11-06 RX ADMIN — PROPOFOL 30 MG: 10 INJECTION, EMULSION INTRAVENOUS at 11:33

## 2023-11-06 RX ADMIN — SODIUM CHLORIDE, POTASSIUM CHLORIDE, SODIUM LACTATE AND CALCIUM CHLORIDE 1000 ML: 600; 310; 30; 20 INJECTION, SOLUTION INTRAVENOUS at 11:04

## 2023-11-06 RX ADMIN — ONDANSETRON 4 MG: 2 INJECTION INTRAMUSCULAR; INTRAVENOUS at 11:30

## 2023-11-06 RX ADMIN — LIDOCAINE HYDROCHLORIDE 2 ML: 10 INJECTION, SOLUTION INFILTRATION; PERINEURAL at 11:31

## 2023-11-06 RX ADMIN — LIDOCAINE HYDROCHLORIDE 2 ML: 10 INJECTION, SOLUTION INFILTRATION; PERINEURAL at 11:34

## 2023-11-06 RX ADMIN — PROPOFOL 100 MCG/KG/MIN: 10 INJECTION, EMULSION INTRAVENOUS at 11:34

## 2023-11-06 RX ADMIN — SODIUM CHLORIDE, POTASSIUM CHLORIDE, SODIUM LACTATE AND CALCIUM CHLORIDE: 600; 310; 30; 20 INJECTION, SOLUTION INTRAVENOUS at 10:58

## 2023-11-06 ASSESSMENT — ACTIVITIES OF DAILY LIVING (ADL): ADLS_ACUITY_SCORE: 35

## 2023-11-06 NOTE — ANESTHESIA CARE TRANSFER NOTE
Patient: Kylie Amaya    Procedure: Procedure(s):  ESOPHAGOGASTRODUODENOSCOPY WITH BIOPSY       Diagnosis: Abnormal CT scan [R93.89]  Diagnosis Additional Information: No value filed.    Anesthesia Type:   MAC     Note:    Oropharynx: oropharynx clear of all foreign objects  Level of Consciousness: awake  Oxygen Supplementation: room air    Independent Airway: airway patency satisfactory and stable  Dentition: dentition unchanged  Vital Signs Stable: post-procedure vital signs reviewed and stable  Report to RN Given: handoff report given  Patient transferred to: Phase II    Handoff Report: Identifed the Patient, Identified the Reponsible Provider, Reviewed the pertinent medical history, Discussed the surgical course, Reviewed Intra-OP anesthesia mangement and issues during anesthesia, Set expectations for post-procedure period and Allowed opportunity for questions and acknowledgement of understanding      Vitals:  Vitals Value Taken Time   BP     Temp     Pulse     Resp     SpO2         Electronically Signed By: VINEET Fallon CRNA  November 6, 2023  11:49 AM

## 2023-11-06 NOTE — ANESTHESIA PREPROCEDURE EVALUATION
Anesthesia Pre-Procedure Evaluation    Patient: Kylie Amaya   MRN: [id number] : 1935        Procedure : Procedure(s):  ESOPHAGOGASTRODUODENOSCOPY          Past Medical History:   Diagnosis Date     Arthritis       Past Surgical History:   Procedure Laterality Date     APPENDECTOMY       CHOLECYSTECTOMY       COLONOSCOPY       EYE SURGERY       GYN SURGERY       ORTHOPEDIC SURGERY        No Known Allergies   Social History     Tobacco Use     Smoking status: Former     Types: Cigarettes     Smokeless tobacco: Never   Substance Use Topics     Alcohol use: Not on file      Wt Readings from Last 1 Encounters:   23 47.6 kg (105 lb)        Anesthesia Evaluation   Pt has had prior anesthetic.         ROS/MED HX  ENT/Pulmonary:  - neg pulmonary ROS     Neurologic:  - neg neurologic ROS     Cardiovascular:     (+)  hypertension- -  CAD -  - -                           valvular problems/murmurs type: AS mild.         METS/Exercise Tolerance:     Hematologic:  - neg hematologic  ROS     Musculoskeletal:   (+)  arthritis,             GI/Hepatic:     (+)      hiatal hernia,              Renal/Genitourinary:  - neg Renal ROS     Endo:  - neg endo ROS     Psychiatric/Substance Use:  - neg psychiatric ROS     Infectious Disease:  - neg infectious disease ROS     Malignancy:  - neg malignancy ROS     Other:  - neg other ROS        Physical Exam    Airway  airway exam normal      Mallampati: I   TM distance: < 3 FB   Neck ROM: full   Mouth opening: > 3 cm    Respiratory Devices and Support         Dental  no notable dental history     (+) Multiple visibly decayed, broken teeth      Cardiovascular   cardiovascular exam normal       Rhythm and rate: regular and normal     Pulmonary   pulmonary exam normal        breath sounds clear to auscultation       OUTSIDE LABS:  CBC:   Lab Results   Component Value Date    WBC 8.1 2023    HGB 14.5 2023    HCT 43.4 2023     2023     BMP:   Lab  "Results   Component Value Date     09/12/2023    POTASSIUM 4.1 09/12/2023    CHLORIDE 101 09/12/2023    CO2 26 09/12/2023    BUN 19 09/12/2023    CR 0.88 09/12/2023     09/12/2023     COAGS: No results found for: \"PTT\", \"INR\", \"FIBR\"  POC: No results found for: \"BGM\", \"HCG\", \"HCGS\"  HEPATIC:   Lab Results   Component Value Date    ALBUMIN 3.7 09/12/2023    PROTTOTAL 6.7 09/12/2023    ALT 16 09/12/2023    AST 18 09/12/2023    ALKPHOS 77 09/12/2023    BILITOTAL 1.1 (H) 09/12/2023     OTHER:   Lab Results   Component Value Date    OTILIO 9.5 09/12/2023       Anesthesia Plan    ASA Status:  3    NPO Status:  NPO Appropriate    Anesthesia Type: MAC.     - Reason for MAC: straight local not clinically adequate, chronic cardiopulmonary disease              Consents    Anesthesia Plan(s) and associated risks, benefits, and realistic alternatives discussed. Questions answered and patient/representative(s) expressed understanding.     - Discussed:     - Discussed with:  Patient       - Patient is DNR/DNI Status: No          Postoperative Care    Pain management: Multi-modal analgesia.   PONV prophylaxis: Ondansetron (or other 5HT-3)     Comments:              Harmeet Leon MD  "

## 2023-11-06 NOTE — INTERVAL H&P NOTE
I have reviewed the surgical (or preoperative) H&P that is linked to this encounter, and examined the patient. There are no significant changes.    Christofer Abraham MD

## 2023-11-06 NOTE — ANESTHESIA POSTPROCEDURE EVALUATION
Patient: Kylie Amaya    Procedure: Procedure(s):  ESOPHAGOGASTRODUODENOSCOPY WITH BIOPSY       Anesthesia Type:  MAC    Note:  Disposition: Outpatient   Postop Pain Control: Uneventful            Sign Out: Well controlled pain   PONV: No   Neuro/Psych: Uneventful            Sign Out: Acceptable/Baseline neuro status   Airway/Respiratory: Uneventful            Sign Out: Acceptable/Baseline resp. status   CV/Hemodynamics: Uneventful            Sign Out: Acceptable CV status; No obvious hypovolemia; No obvious fluid overload   Other NRE: NONE   DID A NON-ROUTINE EVENT OCCUR? No    Event details/Postop Comments:  Patient recovering comfortably.        Last vitals:  Vitals Value Taken Time   /84 11/06/23 1202   Temp 36.9  C (98.5  F) 11/06/23 1200   Pulse 71 11/06/23 1202   Resp 16 11/06/23 1200   SpO2 96 % 11/06/23 1202   Vitals shown include unfiled device data.    Electronically Signed By: Kapil Wang DO  November 6, 2023  1:36 PM

## 2023-11-07 LAB
PATH REPORT.COMMENTS IMP SPEC: NORMAL
PATH REPORT.COMMENTS IMP SPEC: NORMAL
PATH REPORT.FINAL DX SPEC: NORMAL
PATH REPORT.GROSS SPEC: NORMAL
PATH REPORT.MICROSCOPIC SPEC OTHER STN: NORMAL
PATH REPORT.RELEVANT HX SPEC: NORMAL
PHOTO IMAGE: NORMAL

## 2023-11-07 PROCEDURE — 88342 IMHCHEM/IMCYTCHM 1ST ANTB: CPT | Mod: 26 | Performed by: PATHOLOGY

## 2023-11-07 PROCEDURE — 88305 TISSUE EXAM BY PATHOLOGIST: CPT | Mod: 26 | Performed by: PATHOLOGY

## 2023-11-09 RX ORDER — TRAZODONE HYDROCHLORIDE 50 MG/1
50 TABLET, FILM COATED ORAL AT BEDTIME
COMMUNITY

## 2023-11-09 RX ORDER — ATORVASTATIN CALCIUM 40 MG/1
40 TABLET, FILM COATED ORAL DAILY
COMMUNITY

## 2023-11-09 RX ORDER — LIDOCAINE 50 MG/G
OINTMENT TOPICAL 4 TIMES DAILY PRN
COMMUNITY

## 2023-11-13 NOTE — PROGRESS NOTES
Planning to discharge home on POD 1 with her daughters staying with her.       11/13/23 2671   Discharge Planning   Patient/Family Anticipates Transition to home with family  (Currently has home care physical therapy twice a week. Deshaun Reyna with [facility].)   Concerns to be Addressed all concerns addressed in this encounter   Living Arrangements   People in Home alone   Type of Residence Private Residence   Is your private residence a single family home or apartment? Apartment   Number of Stairs, Within Home, Primary none  (elevator)   Once home, are you able to live on one level? Yes   Which level? Main Level   Bathroom Shower/Tub Walk-in shower   Equipment Currently Used at Home grab bar, toilet;grab bar, tub/shower;raised toilet seat;shower chair;walker, rolling   Support System   Support Systems Children  (Daughter, Karon, Ashley, and Brooke, will be helping her after surgery and staying with her.)   Do you have someone available to stay with you one or two nights once you are home? Yes   Education   Patient attended total joint pre-op class/received pre-op teaching  email/phone call

## 2023-11-20 NOTE — TREATMENT PLAN
"Orthopedic Surgery Pre-Op Plan: Kylie Amaya  pre-op review. This is NOT an H&P   Surgeon: Dr. Templeton   [facility]: [facility]  Name of Surgery: Right Total Hip Arthroplasty- Posterior Approach  Date of Surgery: 11/21/23  H&P: Completed on 11/14/23 by Dr. Rasmussen at [facility].   History of ASA, NSAIDS, vitamin and/or herbal supplements, GLP-1 Agonist medication taken within 10 days?: Yes- ASA 81 mg daily for history of Non-Obstructive CAD- patient held this for 7 days before surgery.   History of blood thinners?: No    Plan:   1) Discharge Plan: Planning to discharge home on POD 1 with her daughters staying with her. Please see Discharge Planning section near bottom of this note for further details.     2) Anesthesia Complications: patient reports that she is slow to awaken from general anesthesia. Also reports that she is very sensitive to opioid pain medications which often cause her nausea/vomiting.  I recommend patient discusses this history with preop nursing and anesthesia on day of surgery.  Recommend judicious use of pain medications.  Would also likely benefit from treatment with antiemetic medication to help minimize or prevent nausea/vomiting.    3) Coronary Artery Disease: Non-Obstructive: Per recent CT cardiac angiogram 10/24/2023. Reviewed recent Cardiology evaluation with Dr. English from 11/8/23: per Dr. English, \" The patient's at acceptable risk for proceeding to surgery. She understands that her risk is not zero given her underlying nonobstructive CAD and moderate AS. I would avoid any large fluid shifts at surgery. I would also recommend continuing her aspirin and statin if possible.\"     4) Dyspnea on Exertion: Chronic: Chronic and with no recent change. Denies any chest pain or chest discomfort. Recent CT cardiac angiogram 10/24/23 showed non-obstructive coronary artery disease. Cleared by Cardiology and PCP for surgery.    5) Aortic Stenosis: Moderate: Stable per most recent " Echo 10/24/23. Cardiology will continue to monitor this with repeat Echo in 1 year.     6) Right Bundle Branch Block: seen on EKG 11/2022, not seen on most recent EKG 11/2023.     7) Hyperlipidemia: On atorvastatin.    8) Hypertension: Well-controlled on furosemide.  Patient instructed to hold furosemide on the morning of surgery.    9) GERD: On pantoprazole.    10) Hiatal Hernia: large hiatal hernia on recent EGD. Following with GI.     11) Gilbert's Syndrome: Follows with GI.     12) Macular Degeneration: has some resulting central vision loss.     Patient appears medically optimized for upcoming surgery. I would recommend Hospitalist Consult to assist with medical management. Please call me below with any questions on this patient.       Review of Systems Notable for: Anesthesia complications-slow to awaken, very sensitive to opioid pain medications, PONV, coronary artery disease- non-obstructive-cleared by cardiology for surgery, dyspnea on exertion-chronic, aortic stenosis-moderate, right bundle branch block, hyperlipidemia, hypertension, GERD, hiatal hernia-follows with GI, Villarreal Bears syndrome, macular degeneration.    Past Medical History:   Past Medical History:   Diagnosis Date    Arthritis     Coronary artery disease     Gastroesophageal reflux disease     Gilbert's syndrome     Hiatal hernia     History of blood transfusion 1977    History of skin cancer     History of vertebral compression fracture 2015    Lumbar    Hyperlipidemia     Hypertension     Macular degeneration of both eyes     Limited Central Vision    Mild aortic stenosis     Osteoporosis     PONV (postoperative nausea and vomiting)     Prolonged QT interval     Slow to wake up after anesthesia     General anesthesia     Past Surgical History:   Procedure Laterality Date    BREAST BIOPSY, RT/LT      x3    CATARACT EXTRACTION Bilateral     COLONOSCOPY      ELBOW SURGERY Right     1980's    ESOPHAGOSCOPY, GASTROSCOPY, DUODENOSCOPY (EGD),  "COMBINED N/A 11/06/2023    Procedure: ESOPHAGOGASTRODUODENOSCOPY WITH BIOPSY;  Surgeon: Christofer Bronson MD;  Location: [facility] Main OR    FOOT SURGERY Left     Bone spur removal    HYSTERECTOMY TOTAL ABDOMINAL, BILATERAL SALPINGO-OOPHORECTOMY, COMBINED  1977    MOHS MICROGRAPHIC PROCEDURE      skin cancer removal on nose    TONSILLECTOMY  1955    TOTAL HIP ARTHROPLASTY Left 10/23/2007       Current Medications:  Patient's Medications   New Prescriptions    No medications on file   Previous Medications    ACETAMINOPHEN (TYLENOL) 325 MG TABLET    Take 3 tablets (975 mg) by mouth every 6 hours    ALENDRONATE (FOSAMAX) 70 MG TABLET    Take 70 mg by mouth every 7 days    ASPIRIN 81 MG EC TABLET    Take 81 mg by mouth daily Stopping 11/14/23 before surgery    ATORVASTATIN (LIPITOR) 40 MG TABLET    Take 40 mg by mouth daily    CYANOCOBALAMIN (CYANOCOBALAMIN) 1000 MCG/ML INJECTION    Inject 1 mL into the muscle every 30 days    DICLOFENAC (VOLTAREN) 1 % TOPICAL GEL    Apply 2 g topically 4 times daily    FUROSEMIDE (LASIX) 20 MG TABLET    Take 20 mg by mouth every morning    LIDOCAINE (XYLOCAINE) 5 % EXTERNAL OINTMENT    Apply topically 4 times daily as needed for moderate pain    LORATADINE (CLARITIN) 10 MG TABLET    Take 10 mg by mouth daily    ONDANSETRON (ZOFRAN) 4 MG TABLET    Take 4 mg by mouth every 8 hours as needed for nausea    PANTOPRAZOLE (PROTONIX) 20 MG EC TABLET    Take 20 mg by mouth 2 times daily    TRAZODONE (DESYREL) 50 MG TABLET    Take 50 mg by mouth at bedtime   Modified Medications    No medications on file   Discontinued Medications    ATORVASTATIN (LIPITOR) 20 MG TABLET    Take 20 mg by mouth At Bedtime    LIDOCAINE (XYLOCAINE) 5 % EXTERNAL OINTMENT    Apply topically 4 times daily       ALLERGIES:  Allergies   Allergen Reactions    Azithromycin Diarrhea     Bloody diarrhea    Codeine Nausea and Vomiting and Confusion    Ibuprofen Nausea    Oxycodone Nausea and Vomiting     \"Any oral " "pain medication causes nausea and vomiting\"-hospital made plan with topical medications-lidocaine & voltaren for the past 6 weeks    Propoxyphene Nausea and Vomiting and Confusion       Social History  Social History     Tobacco Use    Smoking status: Former     Types: Cigarettes     Start date:      Quit date:      Years since quittin.9    Smokeless tobacco: Never   Vaping Use    Vaping Use: Never used   Substance Use Topics    Alcohol use: Yes     Comment: 3-4 drinks per week    Drug use: Never       Any Abnormal Recent Diagnostics? No    Discharge Planning:   Planning to discharge home on POD 1 with her daughters staying with her.        23 1441   Discharge Planning   Patient/Family Anticipates Transition to home with family  (Currently has home care physical therapy twice a week. Deshaun Reyna with [facility].)   Concerns to be Addressed all concerns addressed in this encounter   Living Arrangements   People in Home alone   Type of Residence Private Residence   Is your private residence a single family home or apartment? Apartment   Number of Stairs, Within Home, Primary none  (elevator)   Once home, are you able to live on one level? Yes   Which level? Main Level   Bathroom Shower/Tub Walk-in shower   Equipment Currently Used at Home grab bar, toilet;grab bar, tub/shower;raised toilet seat;shower chair;walker, rolling   Support System   Support Systems Children  (Daughter, Karon, Ashley, and Brooke, will be helping her after surgery and staying with her.)   Do you have someone available to stay with you one or two nights once you are home? Yes       VINEET Arreaga, CNP   Advanced Practice Nurse Navigator- Orthopedics  [facility]   Phone: [phone]   "

## 2023-11-21 ENCOUNTER — APPOINTMENT (OUTPATIENT)
Dept: RADIOLOGY | Facility: CLINIC | Age: 88
DRG: 470 | End: 2023-11-21
Attending: STUDENT IN AN ORGANIZED HEALTH CARE EDUCATION/TRAINING PROGRAM
Payer: MEDICARE

## 2023-11-21 ENCOUNTER — HOSPITAL ENCOUNTER (INPATIENT)
Facility: CLINIC | Age: 88
LOS: 2 days | Discharge: NURSING, REHAB OR LONG-TERM CARE | DRG: 470 | End: 2023-11-24
Attending: STUDENT IN AN ORGANIZED HEALTH CARE EDUCATION/TRAINING PROGRAM | Admitting: STUDENT IN AN ORGANIZED HEALTH CARE EDUCATION/TRAINING PROGRAM
Payer: MEDICARE

## 2023-11-21 ENCOUNTER — DOCUMENTATION ONLY (OUTPATIENT)
Dept: OTHER | Facility: CLINIC | Age: 88
End: 2023-11-21
Payer: MEDICARE

## 2023-11-21 ENCOUNTER — ANESTHESIA EVENT (OUTPATIENT)
Dept: SURGERY | Facility: CLINIC | Age: 88
DRG: 470 | End: 2023-11-21
Payer: MEDICARE

## 2023-11-21 ENCOUNTER — ANESTHESIA (OUTPATIENT)
Dept: SURGERY | Facility: CLINIC | Age: 88
DRG: 470 | End: 2023-11-21
Payer: MEDICARE

## 2023-11-21 DIAGNOSIS — Z96.641 S/P HIP REPLACEMENT, RIGHT: Primary | ICD-10-CM

## 2023-11-21 PROCEDURE — C1713 ANCHOR/SCREW BN/BN,TIS/BN: HCPCS | Performed by: STUDENT IN AN ORGANIZED HEALTH CARE EDUCATION/TRAINING PROGRAM

## 2023-11-21 PROCEDURE — 710N000010 HC RECOVERY PHASE 1, LEVEL 2, PER MIN: Performed by: STUDENT IN AN ORGANIZED HEALTH CARE EDUCATION/TRAINING PROGRAM

## 2023-11-21 PROCEDURE — 999N000141 HC STATISTIC PRE-PROCEDURE NURSING ASSESSMENT: Performed by: STUDENT IN AN ORGANIZED HEALTH CARE EDUCATION/TRAINING PROGRAM

## 2023-11-21 PROCEDURE — 250N000011 HC RX IP 250 OP 636: Performed by: STUDENT IN AN ORGANIZED HEALTH CARE EDUCATION/TRAINING PROGRAM

## 2023-11-21 PROCEDURE — 0SR9029 REPLACEMENT OF RIGHT HIP JOINT WITH METAL ON POLYETHYLENE SYNTHETIC SUBSTITUTE, CEMENTED, OPEN APPROACH: ICD-10-PCS | Performed by: STUDENT IN AN ORGANIZED HEALTH CARE EDUCATION/TRAINING PROGRAM

## 2023-11-21 PROCEDURE — 250N000011 HC RX IP 250 OP 636: Mod: JZ

## 2023-11-21 PROCEDURE — 360N000077 HC SURGERY LEVEL 4, PER MIN: Performed by: STUDENT IN AN ORGANIZED HEALTH CARE EDUCATION/TRAINING PROGRAM

## 2023-11-21 PROCEDURE — 250N000011 HC RX IP 250 OP 636: Performed by: PHYSICIAN ASSISTANT

## 2023-11-21 PROCEDURE — 250N000013 HC RX MED GY IP 250 OP 250 PS 637: Performed by: PHYSICIAN ASSISTANT

## 2023-11-21 PROCEDURE — 250N000009 HC RX 250: Performed by: PHYSICIAN ASSISTANT

## 2023-11-21 PROCEDURE — 99204 OFFICE O/P NEW MOD 45 MIN: CPT | Performed by: FAMILY MEDICINE

## 2023-11-21 PROCEDURE — 999N000065 XR PELVIS AND HIP PORTABLE RIGHT 1 VIEW

## 2023-11-21 PROCEDURE — C1776 JOINT DEVICE (IMPLANTABLE): HCPCS | Performed by: STUDENT IN AN ORGANIZED HEALTH CARE EDUCATION/TRAINING PROGRAM

## 2023-11-21 PROCEDURE — 272N000001 HC OR GENERAL SUPPLY STERILE: Performed by: STUDENT IN AN ORGANIZED HEALTH CARE EDUCATION/TRAINING PROGRAM

## 2023-11-21 PROCEDURE — 250N000013 HC RX MED GY IP 250 OP 250 PS 637: Performed by: STUDENT IN AN ORGANIZED HEALTH CARE EDUCATION/TRAINING PROGRAM

## 2023-11-21 PROCEDURE — 258N000003 HC RX IP 258 OP 636

## 2023-11-21 PROCEDURE — 370N000017 HC ANESTHESIA TECHNICAL FEE, PER MIN: Performed by: STUDENT IN AN ORGANIZED HEALTH CARE EDUCATION/TRAINING PROGRAM

## 2023-11-21 PROCEDURE — 258N000003 HC RX IP 258 OP 636: Performed by: STUDENT IN AN ORGANIZED HEALTH CARE EDUCATION/TRAINING PROGRAM

## 2023-11-21 PROCEDURE — 250N000009 HC RX 250

## 2023-11-21 PROCEDURE — 250N000013 HC RX MED GY IP 250 OP 250 PS 637

## 2023-11-21 DEVICE — PINNACLE GRIPTION ACETABULAR SHELL SECTOR 48MM OD
Type: IMPLANTABLE DEVICE | Site: HIP | Status: FUNCTIONAL
Brand: PINNACLE GRIPTION

## 2023-11-21 DEVICE — PINNACLE HIP SOLUTIONS ALTRX POLYETHYLENE ACETABULAR LINER NEUTRAL 32MM ID 48MM OD
Type: IMPLANTABLE DEVICE | Site: HIP | Status: FUNCTIONAL
Brand: PINNACLE ALTRX

## 2023-11-21 DEVICE — ARTICUL/EZE FEMORAL HEAD DIAMETER 32MM +9 12/14 TAPER
Type: IMPLANTABLE DEVICE | Site: HIP | Status: FUNCTIONAL
Brand: ARTICUL/EZE

## 2023-11-21 DEVICE — PINNACLE CANCELLOUS BONE SCREW 6.5MM X 30MM
Type: IMPLANTABLE DEVICE | Site: HIP | Status: FUNCTIONAL
Brand: PINNACLE

## 2023-11-21 DEVICE — ACTIS DUOFIX HIP PROSTHESIS (FEMORAL STEM 12/14 TAPER CEMENTLESS SIZE 5 HIGH COLLAR)  CE
Type: IMPLANTABLE DEVICE | Site: HIP | Status: FUNCTIONAL
Brand: ACTIS

## 2023-11-21 RX ORDER — GLYCOPYRROLATE 0.2 MG/ML
INJECTION, SOLUTION INTRAMUSCULAR; INTRAVENOUS PRN
Status: DISCONTINUED | OUTPATIENT
Start: 2023-11-21 | End: 2023-11-21

## 2023-11-21 RX ORDER — BUPIVACAINE HYDROCHLORIDE 5 MG/ML
INJECTION, SOLUTION EPIDURAL; INTRACAUDAL
Status: COMPLETED | OUTPATIENT
Start: 2023-11-21 | End: 2023-11-21

## 2023-11-21 RX ORDER — LIDOCAINE 50 MG/G
OINTMENT TOPICAL 4 TIMES DAILY PRN
Status: DISCONTINUED | OUTPATIENT
Start: 2023-11-21 | End: 2023-11-24 | Stop reason: HOSPADM

## 2023-11-21 RX ORDER — CEFAZOLIN SODIUM/WATER 2 G/20 ML
2 SYRINGE (ML) INTRAVENOUS SEE ADMIN INSTRUCTIONS
Status: DISCONTINUED | OUTPATIENT
Start: 2023-11-21 | End: 2023-11-21 | Stop reason: HOSPADM

## 2023-11-21 RX ORDER — AMOXICILLIN 250 MG
1 CAPSULE ORAL 2 TIMES DAILY
Status: DISCONTINUED | OUTPATIENT
Start: 2023-11-21 | End: 2023-11-24

## 2023-11-21 RX ORDER — ONDANSETRON 4 MG/1
4 TABLET, ORALLY DISINTEGRATING ORAL EVERY 6 HOURS PRN
Status: DISCONTINUED | OUTPATIENT
Start: 2023-11-21 | End: 2023-11-24 | Stop reason: HOSPADM

## 2023-11-21 RX ORDER — FENTANYL CITRATE 0.05 MG/ML
INJECTION, SOLUTION INTRAMUSCULAR; INTRAVENOUS PRN
Status: DISCONTINUED | OUTPATIENT
Start: 2023-11-21 | End: 2023-11-21

## 2023-11-21 RX ORDER — ACETAMINOPHEN 325 MG/1
975 TABLET ORAL ONCE
Status: COMPLETED | OUTPATIENT
Start: 2023-11-21 | End: 2023-11-21

## 2023-11-21 RX ORDER — ACETAMINOPHEN 325 MG/1
650 TABLET ORAL EVERY 4 HOURS PRN
Qty: 100 TABLET | Refills: 0 | Status: SHIPPED | OUTPATIENT
Start: 2023-11-21

## 2023-11-21 RX ORDER — LIDOCAINE 40 MG/G
CREAM TOPICAL
Status: DISCONTINUED | OUTPATIENT
Start: 2023-11-21 | End: 2023-11-21 | Stop reason: HOSPADM

## 2023-11-21 RX ORDER — LIDOCAINE HYDROCHLORIDE 10 MG/ML
INJECTION, SOLUTION INFILTRATION; PERINEURAL PRN
Status: DISCONTINUED | OUTPATIENT
Start: 2023-11-21 | End: 2023-11-21

## 2023-11-21 RX ORDER — SODIUM CHLORIDE, SODIUM LACTATE, POTASSIUM CHLORIDE, CALCIUM CHLORIDE 600; 310; 30; 20 MG/100ML; MG/100ML; MG/100ML; MG/100ML
INJECTION, SOLUTION INTRAVENOUS CONTINUOUS
Status: DISCONTINUED | OUTPATIENT
Start: 2023-11-21 | End: 2023-11-21 | Stop reason: HOSPADM

## 2023-11-21 RX ORDER — NALOXONE HYDROCHLORIDE 0.4 MG/ML
0.4 INJECTION, SOLUTION INTRAMUSCULAR; INTRAVENOUS; SUBCUTANEOUS
Status: DISCONTINUED | OUTPATIENT
Start: 2023-11-21 | End: 2023-11-24 | Stop reason: HOSPADM

## 2023-11-21 RX ORDER — BISACODYL 10 MG
10 SUPPOSITORY, RECTAL RECTAL DAILY PRN
Status: DISCONTINUED | OUTPATIENT
Start: 2023-11-21 | End: 2023-11-24 | Stop reason: HOSPADM

## 2023-11-21 RX ORDER — FENTANYL CITRATE 50 UG/ML
50 INJECTION, SOLUTION INTRAMUSCULAR; INTRAVENOUS EVERY 5 MIN PRN
Status: DISCONTINUED | OUTPATIENT
Start: 2023-11-21 | End: 2023-11-21 | Stop reason: HOSPADM

## 2023-11-21 RX ORDER — ACETAMINOPHEN 325 MG/1
975 TABLET ORAL EVERY 8 HOURS
Qty: 27 TABLET | Refills: 0 | Status: DISCONTINUED | OUTPATIENT
Start: 2023-11-21 | End: 2023-11-24 | Stop reason: HOSPADM

## 2023-11-21 RX ORDER — CARBOXYMETHYLCELLULOSE SODIUM 5 MG/ML
1 SOLUTION/ DROPS OPHTHALMIC 3 TIMES DAILY PRN
COMMUNITY

## 2023-11-21 RX ORDER — OXYCODONE HYDROCHLORIDE 5 MG/1
5 TABLET ORAL EVERY 4 HOURS PRN
Status: DISCONTINUED | OUTPATIENT
Start: 2023-11-21 | End: 2023-11-24 | Stop reason: HOSPADM

## 2023-11-21 RX ORDER — HALOPERIDOL 5 MG/ML
1 INJECTION INTRAMUSCULAR
Status: DISCONTINUED | OUTPATIENT
Start: 2023-11-21 | End: 2023-11-21 | Stop reason: HOSPADM

## 2023-11-21 RX ORDER — ATORVASTATIN CALCIUM 40 MG/1
40 TABLET, FILM COATED ORAL EVERY EVENING
Status: DISCONTINUED | OUTPATIENT
Start: 2023-11-21 | End: 2023-11-24 | Stop reason: HOSPADM

## 2023-11-21 RX ORDER — AMOXICILLIN 250 MG
1-2 CAPSULE ORAL 2 TIMES DAILY
Qty: 30 TABLET | Refills: 0 | Status: SHIPPED | OUTPATIENT
Start: 2023-11-21

## 2023-11-21 RX ORDER — HYDROMORPHONE HCL IN WATER/PF 6 MG/30 ML
0.2 PATIENT CONTROLLED ANALGESIA SYRINGE INTRAVENOUS EVERY 5 MIN PRN
Status: DISCONTINUED | OUTPATIENT
Start: 2023-11-21 | End: 2023-11-21 | Stop reason: HOSPADM

## 2023-11-21 RX ORDER — DEXAMETHASONE SODIUM PHOSPHATE 10 MG/ML
INJECTION, SOLUTION INTRAMUSCULAR; INTRAVENOUS PRN
Status: DISCONTINUED | OUTPATIENT
Start: 2023-11-21 | End: 2023-11-21

## 2023-11-21 RX ORDER — HYDROMORPHONE HCL IN WATER/PF 6 MG/30 ML
0.2 PATIENT CONTROLLED ANALGESIA SYRINGE INTRAVENOUS
Status: DISCONTINUED | OUTPATIENT
Start: 2023-11-21 | End: 2023-11-24 | Stop reason: HOSPADM

## 2023-11-21 RX ORDER — LIDOCAINE 40 MG/G
CREAM TOPICAL
Status: DISCONTINUED | OUTPATIENT
Start: 2023-11-21 | End: 2023-11-24 | Stop reason: HOSPADM

## 2023-11-21 RX ORDER — PANTOPRAZOLE SODIUM 20 MG/1
20 TABLET, DELAYED RELEASE ORAL
Status: DISCONTINUED | OUTPATIENT
Start: 2023-11-21 | End: 2023-11-24 | Stop reason: HOSPADM

## 2023-11-21 RX ORDER — KETOROLAC TROMETHAMINE 15 MG/ML
15 INJECTION, SOLUTION INTRAMUSCULAR; INTRAVENOUS EVERY 6 HOURS PRN
Status: DISCONTINUED | OUTPATIENT
Start: 2023-11-21 | End: 2023-11-24 | Stop reason: HOSPADM

## 2023-11-21 RX ORDER — POLYETHYLENE GLYCOL 3350 17 G/17G
17 POWDER, FOR SOLUTION ORAL DAILY
Status: DISCONTINUED | OUTPATIENT
Start: 2023-11-25 | End: 2023-11-24 | Stop reason: HOSPADM

## 2023-11-21 RX ORDER — ONDANSETRON 8 MG/1
8 TABLET, FILM COATED ORAL EVERY 8 HOURS PRN
Status: DISCONTINUED | OUTPATIENT
Start: 2023-11-21 | End: 2023-11-24 | Stop reason: HOSPADM

## 2023-11-21 RX ORDER — ASPIRIN 325 MG
325 TABLET, DELAYED RELEASE (ENTERIC COATED) ORAL DAILY
Qty: 40 TABLET | Refills: 0 | Status: SHIPPED | OUTPATIENT
Start: 2023-11-21

## 2023-11-21 RX ORDER — CHOLECALCIFEROL (VITAMIN D3) 1250 MCG
1250 CAPSULE ORAL
COMMUNITY

## 2023-11-21 RX ORDER — GABAPENTIN 300 MG/1
300 CAPSULE ORAL AT BEDTIME
Status: DISCONTINUED | OUTPATIENT
Start: 2023-11-21 | End: 2023-11-24

## 2023-11-21 RX ORDER — NALOXONE HYDROCHLORIDE 0.4 MG/ML
0.2 INJECTION, SOLUTION INTRAMUSCULAR; INTRAVENOUS; SUBCUTANEOUS
Status: DISCONTINUED | OUTPATIENT
Start: 2023-11-21 | End: 2023-11-24 | Stop reason: HOSPADM

## 2023-11-21 RX ORDER — CETIRIZINE HYDROCHLORIDE 5 MG/1
5 TABLET ORAL DAILY
Status: DISCONTINUED | OUTPATIENT
Start: 2023-11-21 | End: 2023-11-24 | Stop reason: HOSPADM

## 2023-11-21 RX ORDER — ONDANSETRON 2 MG/ML
4 INJECTION INTRAMUSCULAR; INTRAVENOUS EVERY 6 HOURS PRN
Status: DISCONTINUED | OUTPATIENT
Start: 2023-11-21 | End: 2023-11-24 | Stop reason: HOSPADM

## 2023-11-21 RX ORDER — HYDROMORPHONE HCL IN WATER/PF 6 MG/30 ML
0.4 PATIENT CONTROLLED ANALGESIA SYRINGE INTRAVENOUS EVERY 5 MIN PRN
Status: DISCONTINUED | OUTPATIENT
Start: 2023-11-21 | End: 2023-11-21 | Stop reason: HOSPADM

## 2023-11-21 RX ORDER — TRANEXAMIC ACID 650 MG/1
1950 TABLET ORAL ONCE
Status: COMPLETED | OUTPATIENT
Start: 2023-11-21 | End: 2023-11-21

## 2023-11-21 RX ORDER — PROCHLORPERAZINE MALEATE 5 MG
5 TABLET ORAL EVERY 6 HOURS PRN
Status: DISCONTINUED | OUTPATIENT
Start: 2023-11-21 | End: 2023-11-24 | Stop reason: HOSPADM

## 2023-11-21 RX ORDER — ONDANSETRON 2 MG/ML
4 INJECTION INTRAMUSCULAR; INTRAVENOUS EVERY 30 MIN PRN
Status: DISCONTINUED | OUTPATIENT
Start: 2023-11-21 | End: 2023-11-21 | Stop reason: HOSPADM

## 2023-11-21 RX ORDER — PROPOFOL 10 MG/ML
INJECTION, EMULSION INTRAVENOUS CONTINUOUS PRN
Status: DISCONTINUED | OUTPATIENT
Start: 2023-11-21 | End: 2023-11-21

## 2023-11-21 RX ORDER — ONDANSETRON 4 MG/1
4 TABLET, ORALLY DISINTEGRATING ORAL EVERY 30 MIN PRN
Status: DISCONTINUED | OUTPATIENT
Start: 2023-11-21 | End: 2023-11-21 | Stop reason: HOSPADM

## 2023-11-21 RX ORDER — CETIRIZINE HYDROCHLORIDE 5 MG/1
5 TABLET ORAL DAILY
COMMUNITY

## 2023-11-21 RX ORDER — FUROSEMIDE 20 MG
20 TABLET ORAL DAILY PRN
Status: DISCONTINUED | OUTPATIENT
Start: 2023-11-21 | End: 2023-11-24 | Stop reason: HOSPADM

## 2023-11-21 RX ORDER — CEFAZOLIN SODIUM 1 G/3ML
1 INJECTION, POWDER, FOR SOLUTION INTRAMUSCULAR; INTRAVENOUS EVERY 8 HOURS
Qty: 10 ML | Refills: 0 | Status: COMPLETED | OUTPATIENT
Start: 2023-11-21 | End: 2023-11-22

## 2023-11-21 RX ORDER — ACETAMINOPHEN 325 MG/1
650 TABLET ORAL EVERY 4 HOURS PRN
Status: DISCONTINUED | OUTPATIENT
Start: 2023-11-24 | End: 2023-11-24 | Stop reason: HOSPADM

## 2023-11-21 RX ORDER — HYDROXYZINE HYDROCHLORIDE 25 MG/1
25 TABLET, FILM COATED ORAL EVERY 6 HOURS PRN
Status: DISCONTINUED | OUTPATIENT
Start: 2023-11-21 | End: 2023-11-24 | Stop reason: HOSPADM

## 2023-11-21 RX ORDER — CARBOXYMETHYLCELLULOSE SODIUM 5 MG/ML
1 SOLUTION/ DROPS OPHTHALMIC 3 TIMES DAILY PRN
Status: DISCONTINUED | OUTPATIENT
Start: 2023-11-21 | End: 2023-11-24 | Stop reason: HOSPADM

## 2023-11-21 RX ORDER — SODIUM CHLORIDE, SODIUM LACTATE, POTASSIUM CHLORIDE, CALCIUM CHLORIDE 600; 310; 30; 20 MG/100ML; MG/100ML; MG/100ML; MG/100ML
INJECTION, SOLUTION INTRAVENOUS CONTINUOUS
Status: DISCONTINUED | OUTPATIENT
Start: 2023-11-21 | End: 2023-11-22

## 2023-11-21 RX ORDER — FENTANYL CITRATE-0.9 % NACL/PF 10 MCG/ML
PLASTIC BAG, INJECTION (ML) INTRAVENOUS CONTINUOUS PRN
Status: DISCONTINUED | OUTPATIENT
Start: 2023-11-21 | End: 2023-11-21

## 2023-11-21 RX ORDER — ASPIRIN 325 MG
325 TABLET, DELAYED RELEASE (ENTERIC COATED) ORAL DAILY
Status: DISCONTINUED | OUTPATIENT
Start: 2023-11-21 | End: 2023-11-24 | Stop reason: HOSPADM

## 2023-11-21 RX ORDER — CEFAZOLIN SODIUM/WATER 2 G/20 ML
2 SYRINGE (ML) INTRAVENOUS
Status: COMPLETED | OUTPATIENT
Start: 2023-11-21 | End: 2023-11-21

## 2023-11-21 RX ORDER — FENTANYL CITRATE 50 UG/ML
25 INJECTION, SOLUTION INTRAMUSCULAR; INTRAVENOUS EVERY 5 MIN PRN
Status: DISCONTINUED | OUTPATIENT
Start: 2023-11-21 | End: 2023-11-21 | Stop reason: HOSPADM

## 2023-11-21 RX ORDER — PROPOFOL 10 MG/ML
INJECTION, EMULSION INTRAVENOUS PRN
Status: DISCONTINUED | OUTPATIENT
Start: 2023-11-21 | End: 2023-11-21

## 2023-11-21 RX ORDER — HYDROMORPHONE HCL IN WATER/PF 6 MG/30 ML
0.4 PATIENT CONTROLLED ANALGESIA SYRINGE INTRAVENOUS
Status: DISCONTINUED | OUTPATIENT
Start: 2023-11-21 | End: 2023-11-24 | Stop reason: HOSPADM

## 2023-11-21 RX ORDER — ONDANSETRON 2 MG/ML
INJECTION INTRAMUSCULAR; INTRAVENOUS PRN
Status: DISCONTINUED | OUTPATIENT
Start: 2023-11-21 | End: 2023-11-21

## 2023-11-21 RX ORDER — TRAZODONE HYDROCHLORIDE 50 MG/1
50 TABLET, FILM COATED ORAL AT BEDTIME
Status: DISCONTINUED | OUTPATIENT
Start: 2023-11-21 | End: 2023-11-24 | Stop reason: HOSPADM

## 2023-11-21 RX ADMIN — GLYCOPYRROLATE 0.1 MG: 0.2 INJECTION INTRAMUSCULAR; INTRAVENOUS at 13:15

## 2023-11-21 RX ADMIN — SENNOSIDES AND DOCUSATE SODIUM 1 TABLET: 8.6; 5 TABLET ORAL at 19:45

## 2023-11-21 RX ADMIN — ATORVASTATIN CALCIUM 40 MG: 40 TABLET, FILM COATED ORAL at 19:45

## 2023-11-21 RX ADMIN — CEFAZOLIN 1 G: 1 INJECTION, POWDER, FOR SOLUTION INTRAMUSCULAR; INTRAVENOUS at 19:44

## 2023-11-21 RX ADMIN — ACETAMINOPHEN 975 MG: 325 TABLET ORAL at 11:46

## 2023-11-21 RX ADMIN — SODIUM CHLORIDE, POTASSIUM CHLORIDE, SODIUM LACTATE AND CALCIUM CHLORIDE: 600; 310; 30; 20 INJECTION, SOLUTION INTRAVENOUS at 17:14

## 2023-11-21 RX ADMIN — PROPOFOL 30 MG: 10 INJECTION, EMULSION INTRAVENOUS at 13:10

## 2023-11-21 RX ADMIN — SODIUM CHLORIDE, POTASSIUM CHLORIDE, SODIUM LACTATE AND CALCIUM CHLORIDE: 600; 310; 30; 20 INJECTION, SOLUTION INTRAVENOUS at 12:13

## 2023-11-21 RX ADMIN — ONDANSETRON 4 MG: 2 INJECTION INTRAMUSCULAR; INTRAVENOUS at 13:15

## 2023-11-21 RX ADMIN — Medication 2 G: at 13:01

## 2023-11-21 RX ADMIN — DEXAMETHASONE SODIUM PHOSPHATE 5 MG: 10 INJECTION, SOLUTION INTRAMUSCULAR; INTRAVENOUS at 13:11

## 2023-11-21 RX ADMIN — Medication 20 MCG/MIN: at 13:11

## 2023-11-21 RX ADMIN — ACETAMINOPHEN 975 MG: 325 TABLET ORAL at 19:44

## 2023-11-21 RX ADMIN — TRANEXAMIC ACID 1950 MG: 650 TABLET ORAL at 11:46

## 2023-11-21 RX ADMIN — FENTANYL CITRATE 25 MCG: 0.05 INJECTION, SOLUTION INTRAMUSCULAR; INTRAVENOUS at 13:08

## 2023-11-21 RX ADMIN — ASPIRIN 325 MG: 325 TABLET, DELAYED RELEASE ORAL at 16:35

## 2023-11-21 RX ADMIN — TRAZODONE HYDROCHLORIDE 50 MG: 50 TABLET ORAL at 23:21

## 2023-11-21 RX ADMIN — PANTOPRAZOLE SODIUM 20 MG: 20 TABLET, DELAYED RELEASE ORAL at 16:35

## 2023-11-21 RX ADMIN — CETIRIZINE HYDROCHLORIDE 5 MG: 5 TABLET ORAL at 16:35

## 2023-11-21 RX ADMIN — FENTANYL CITRATE 25 MCG: 0.05 INJECTION, SOLUTION INTRAMUSCULAR; INTRAVENOUS at 13:04

## 2023-11-21 RX ADMIN — BUPIVACAINE HYDROCHLORIDE 2.4 ML: 5 INJECTION, SOLUTION EPIDURAL; INTRACAUDAL; PERINEURAL at 13:05

## 2023-11-21 RX ADMIN — PROPOFOL 150 MCG/KG/MIN: 10 INJECTION, EMULSION INTRAVENOUS at 13:10

## 2023-11-21 RX ADMIN — LIDOCAINE HYDROCHLORIDE 20 MG: 10 INJECTION, SOLUTION INFILTRATION; PERINEURAL at 13:10

## 2023-11-21 ASSESSMENT — ACTIVITIES OF DAILY LIVING (ADL)
ADLS_ACUITY_SCORE: 33
ADLS_ACUITY_SCORE: 26
ADLS_ACUITY_SCORE: 26
ADLS_ACUITY_SCORE: 31
ADLS_ACUITY_SCORE: 35
ADLS_ACUITY_SCORE: 26
ADLS_ACUITY_SCORE: 26

## 2023-11-21 NOTE — OP NOTE
DATE OF SURGERY: 11/21/2023  PREOPERATIVE DIAGNOSIS:  right hip osteoarthritis  POSTOPERATIVE DIAGNOSIS:  right hip osteoarthritis     PROCEDURE:  right total hip arthroplasty, posterior approach     SURGEON: Syd Templeton MD  ASSISTANT: Patricia Wang PA-C. A skilled assistant was necessary for all portions of the surgery due to its complexity including retraction, exposure, implant preparation and placement, trialing, and closure    ANESTHESIA: Spinal  TOURNIQUET TIME:   None  ESTIMATED BLOOD LOSS:  150 ml    SPECIMENS:  None.   DRAINS:  None.   COMPLICATIONS:  None apparent.     INTRAOPERATIVE FINDINGS:  Endstage osteoarthritis with full thickness articular loss, significant synovitis     IMPLANTS: De[employer] Synthes. 48 mm gription cup, 32 mm neutral liner, 5 high offset actis stem, 32+9 metal head     BRIEF HISTORY:  88 year old female with end-stage hip osteoarthritis.  Patient was treated nonsurgically for period time with different nonsurgical strategies such as activity modification, over-the-counter pain medicines, therapy and strengthening, and cortisone injections.  When these treatments were no longer satisfactory to provide sustained relief and patient's hip pain became problematic for their quality of life total hip arthroplasty was reviewed with the patient.  Risks of surgery were discussed including infection, wound healing complications, DVT PE, dislocation, risk of loosening or continued pain, and risk of anesthesia were reviewed with the patient.  Posterior precautions were discussed.  Postoperative expectations were reviewed.  Patient elected proceed with surgery.    DESCRIPTION OF PROCEDURE:  The patient was identified in the preoperative holding area.  The informed consent was reviewed. The operative site was identified and marked. The patient was brought to the operating room and anesthesia was induced.  Patient was placed in the lateral cubitus position.  Secured to the table Stolberg  positioners.  All bony prominences well-padded.  The operative extremity was prepped and draped you sterile fashion.  IV antibiotics were given prior to incision.  A surgical timeout was completed prior to incision.    A curvilinear incision was made over the posterior aspect of the greater trochanter and taken down to the IT band.  The IT band was visualized with a Barba elevator.  The IT band was incised in line with the incision working posterior to the gluteal muscle fascia.  Muscle was bluntly split and the bursa was easily exposed.  Self-retaining East-West retractor was placed.  The hip was internally rotated and the posterior external rotators muscles were evaluated.  A Cobra retractor was placed under gluteus medius.  Piriformis tendon was identified and released from the greater trochanter.  The conjoined tendon was then released off the posterior aspect of the femur and posterior capsule was exposed.  T capsulotomy was performed and the capsular edges were tagged.  Hemostasis was obtained. The hip was dislocated and gentle retractors were placed on either side of the femoral neck.  Femoral neck was cut approximately 1.5 cm proximal lesser trochanter.  The head was removed and sized to the back table.  Acetabular exposure was obtained with a Hohmann over the anterior edge of the acetabulum and a 7 retractor inferiorly after an inferior capsular release.  The pulvinar was excised as was the labrum.  Reaming started at the size of the head which was removed and the cup was medialized and sequential reaming occurred in line with cup position until there was punctate bleeding of the acetabular bone.  A final 48 mm cup was then placed.  With an alignment guide used to evaluate the abduction in the anteversion of the cup.  Once in place the cup was evaluated in relationship to the VASHTI.  Acetabular screw was then placed.  A 32 mm neutral liner was then impacted into position.  Attention was then turned to the  femur and a proximal femoral retractor was used.  Soft tissue was removed from the shoulder of the neck as was the neck and the excess femoral neck bone.  Box osteotome was used on the most lateral surface of the femoral neck followed by canal finder.  A starting broach followed by sequential broaches were then used up to a size in which there was good rotational fit and control the proximal femur.  The hip was then trialed.  Length was felt and stability was checked. The hip was dislocated the broach was removed and the hip was irrigated.  A final 5 high offset stem was placed.  The hip was then trialed again to finalize the head length. Leg length was equal and stability was good up to 80 degrees of IR at 90 degrees of flexion.  A final 32+9 ceramic head was opened and impacted in position.  The hip was reduced and Betadine solution was allowed to soak in the wound for 3 minutes.  The posterior capsule was closed side to side over the femoral head.  The IT band was closed with a few #1 Ethibond sutures followed by running oh strata fix suture.  Soft tissue was closed with 2-0 Vicryl 3-0 Monocryl sutures followed by Steri-Strips and Aquacel dressing.  All sponge needle counts correct in the case.  Patient was woken from anesthesia taken postoperatively in stable condition.      POSTOPERATIVE PLAN:    Patient will be admitted to the hospital on outpatient status for therapy and pain control.  24 hrs IV antibiotics. Weight-bearing as tolerated with posterior hip precautions.  Aspirin 325 mg daily for 6 weeks will be used for DVT prophylaxis.  Follow-up in 2 weeks for wound check.      Syd Templeton MD

## 2023-11-21 NOTE — PHARMACY-ADMISSION MEDICATION HISTORY
Pharmacist Admission Medication History    Admission medication history is complete. The information provided in this note is only as accurate as the sources available at the time of the update.    Information Source(s): Patient and CareEverywhere/SureScripts via in-person    Pertinent Information: n/a       Allergies reviewed with patient and updates made in EHR: yes    Medication History Completed By: Deb Hernandez PharmD 11/21/2023 11:58 AM    Prior to Admission medications    Medication Sig Last Dose Taking? Auth Provider Long Term End Date   acetaminophen (TYLENOL) 325 MG tablet Take 3 tablets (975 mg) by mouth every 6 hours 11/20/2023 at 2000 Yes Julienne Jeronimo MD No    alendronate (FOSAMAX) 70 MG tablet Take 70 mg by mouth every 7 days 11/14/2023 Yes Reported, Patient Yes    aspirin 81 MG EC tablet Take 81 mg by mouth daily Stopping 11/14/23 before surgery 11/14/2023 Yes Unknown, Entered By History     atorvastatin (LIPITOR) 40 MG tablet Take 40 mg by mouth daily 11/20/2023 Yes Reported, Patient No    carboxymethylcellulose PF (REFRESH PLUS) 0.5 % ophthalmic solution Place 1 drop into both eyes 3 times daily as needed for dry eyes Unknown at prn Yes Unknown, Entered By History     cetirizine (ZYRTEC) 5 MG tablet Take 5 mg by mouth daily 11/20/2023 at am Yes Unknown, Entered By History     cholecalciferol (VITAMIN D3) 1250 mcg (04181 units) capsule Take 1,250 mcg by mouth every 7 days Past Week Yes Unknown, Entered By History     cyanocobalamin (CYANOCOBALAMIN) 1000 MCG/ML injection Inject 1 mL into the muscle every 30 days Takes on last day of month 10/31/2023 Yes Unknown, Entered By History     diclofenac (VOLTAREN) 1 % topical gel Apply 2 g topically 4 times daily 11/17/2023 Yes Julienne Jeronimo MD     furosemide (LASIX) 20 MG tablet Take 20 mg by mouth daily as needed (BP >150) 11/20/2023 Yes Unknown, Entered By History Yes    lidocaine (XYLOCAINE) 5 % external ointment Apply  topically 4 times daily as needed for moderate pain Unknown at prn Yes Reported, Patient     ondansetron (ZOFRAN) 4 MG tablet Take 8 mg by mouth every 8 hours as needed for nausea Unknown at prn Yes Unknown, Entered By History     pantoprazole (PROTONIX) 20 MG EC tablet Take 20 mg by mouth 2 times daily 11/20/2023 at pm Yes Unknown, Entered By History     traZODone (DESYREL) 50 MG tablet Take 50 mg by mouth at bedtime 11/20/2023 Yes Reported, Patient No

## 2023-11-21 NOTE — ANESTHESIA CARE TRANSFER NOTE
Patient: Kylie Amaya    Procedure: Procedure(s):  RIGHT TOTAL HIP ARTHROPLASTY -POSTERIOR APPROACH       Diagnosis: Osteoarthritis of right hip [M16.11]  Diagnosis Additional Information: No value filed.    Anesthesia Type:   No value filed.     Note:    Oropharynx: oropharynx clear of all foreign objects  Level of Consciousness: awake  Oxygen Supplementation: face mask  Level of Supplemental Oxygen (L/min / FiO2): 6  Independent Airway: airway patency satisfactory and stable  Dentition: dentition unchanged  Vital Signs Stable: post-procedure vital signs reviewed and stable  Report to RN Given: handoff report given  Patient transferred to: PACU    Handoff Report: Identifed the Patient, Identified the Reponsible Provider, Reviewed the pertinent medical history, Discussed the surgical course, Reviewed Intra-OP anesthesia mangement and issues during anesthesia, Set expectations for post-procedure period and Allowed opportunity for questions and acknowledgement of understanding      Vitals:  Vitals Value Taken Time   /53 11/21/23 1437   Temp     Pulse 110 11/21/23 1438   Resp 33 11/21/23 1438   SpO2 98 % 11/21/23 1438   Vitals shown include unfiled device data.    Electronically Signed By: VINEET Guido CRNA  November 21, 2023  2:39 PM  
113

## 2023-11-21 NOTE — ANESTHESIA PROCEDURE NOTES
"Intrathecal injection Procedure Note    Pre-Procedure   Staff -        Anesthesiologist:  Olimpia Quan MD       Performed By: anesthesiologist       Location: OR       Procedure Start/Stop Times: 11/21/2023 1:05 PM and 11/21/2023 1:10 PM       Pre-Anesthestic Checklist: patient identified, IV checked, risks and benefits discussed, informed consent, monitors and equipment checked, pre-op evaluation, at physician/surgeon's request and post-op pain management  Timeout:       Correct Patient: Yes        Correct Procedure: Yes        Correct Site: Yes        Correct Position: Yes   Procedure Documentation  Procedure: intrathecal injection       Patient Position: sitting       Patient Prep/Sterile Barriers: sterile gloves, mask, patient draped       Skin prep: Chloraprep       Insertion Site: L3-4. (midline approach).       Needle Gauge: 24.        Needle Length (Inches): 4        Spinal Needle Type: Pencan       Introducer used       # of attempts: 1 and  # of redirects:     Assessment/Narrative         Paresthesias: No.       CSF fluid: clear.    Medication(s) Administered   0.5% Bupivacaine PF (Intrathecal) - Intrathecal   2.4 mL - 11/21/2023 1:05:00 PM  Medication Administration Time: 11/21/2023 1:05 PM     Comments:  Significant scoliosis to L.  Successful first pass spinal.      FOR [facility] ONLY:   Pain Team Contact information: please page the Pain Team Via iHigh. Search \"Pain\". During daytime hours, please page the attending first. At night please page the resident first.      "

## 2023-11-21 NOTE — ANESTHESIA PREPROCEDURE EVALUATION
"Anesthesia Pre-Procedure Evaluation    Patient: Kylie Amaya   MRN: [id number] : 1935        Procedure : Procedure(s):  RIGHT TOTAL HIP ARTHROPLASTY -POSTERIOR APPROACH          Past Medical History:   Diagnosis Date    Arthritis     Coronary artery disease     Gastroesophageal reflux disease     Gilbert's syndrome     Hiatal hernia     History of blood transfusion     History of skin cancer     History of vertebral compression fracture 2015    Lumbar    Hyperlipidemia     Hypertension     Macular degeneration of both eyes     Limited Central Vision    Mild aortic stenosis     Osteoporosis     PONV (postoperative nausea and vomiting)     Prolonged QT interval     Slow to wake up after anesthesia     General anesthesia      Past Surgical History:   Procedure Laterality Date    BREAST BIOPSY, RT/LT      x3    CATARACT EXTRACTION Bilateral     COLONOSCOPY      ELBOW SURGERY Right         ESOPHAGOSCOPY, GASTROSCOPY, DUODENOSCOPY (EGD), COMBINED N/A 2023    Procedure: ESOPHAGOGASTRODUODENOSCOPY WITH BIOPSY;  Surgeon: Christofer Bronson MD;  Location: [facility] Main OR    FOOT SURGERY Left     Bone spur removal    HYSTERECTOMY TOTAL ABDOMINAL, BILATERAL SALPINGO-OOPHORECTOMY, COMBINED      MOHS MICROGRAPHIC PROCEDURE      skin cancer removal on nose    TONSILLECTOMY      TOTAL HIP ARTHROPLASTY Left 10/23/2007      Allergies   Allergen Reactions    Azithromycin Diarrhea     Bloody diarrhea    Codeine Nausea and Vomiting and Confusion    Oxycodone Nausea and Vomiting     \"Any oral pain medication causes nausea and vomiting\"-hospital made plan with topical medications-lidocaine & voltaren for the past 6 weeks    Propoxyphene Nausea and Vomiting and Confusion      Social History     Tobacco Use    Smoking status: Former     Types: Cigarettes     Start date:      Quit date:      Years since quittin.9    Smokeless tobacco: Never   Substance Use Topics    Alcohol use: Yes     " "Comment: 3-4 drinks per week      Wt Readings from Last 1 Encounters:   11/21/23 45.4 kg (100 lb)        Anesthesia Evaluation   Pt has had prior anesthetic.     History of anesthetic complications (slow to wake)       ROS/MED HX  ENT/Pulmonary:  - neg pulmonary ROS     Neurologic:  - neg neurologic ROS     Cardiovascular: Comment: PEREZ, at baseline; RBBB; negative stress 2022    (+) Dyslipidemia hypertension- -  CAD (severe but non-obstructive, no stents) -  - -                           valvular problems/murmurs (mod AS)           METS/Exercise Tolerance:     Hematologic:  - neg hematologic  ROS     Musculoskeletal:       GI/Hepatic: Comment: Gilbert syndrome      Renal/Genitourinary:  - neg Renal ROS     Endo:  - neg endo ROS     Psychiatric/Substance Use:       Infectious Disease:  - neg infectious disease ROS     Malignancy:       Other:            Physical Exam    Airway        Mallampati: II   TM distance: > 3 FB   Neck ROM: full   Mouth opening: > 3 cm    Respiratory Devices and Support         Dental     Comment: Fair condition        Cardiovascular          Rhythm and rate: regular and normal     Pulmonary           breath sounds clear to auscultation           OUTSIDE LABS:  CBC:   Lab Results   Component Value Date    WBC 8.1 09/12/2023    HGB 14.5 09/12/2023    HCT 43.4 09/12/2023     09/12/2023     BMP:   Lab Results   Component Value Date     09/12/2023    POTASSIUM 4.1 09/12/2023    CHLORIDE 101 09/12/2023    CO2 26 09/12/2023    BUN 19 09/12/2023    CR 0.88 09/12/2023     09/12/2023     COAGS: No results found for: \"PTT\", \"INR\", \"FIBR\"  POC: No results found for: \"BGM\", \"HCG\", \"HCGS\"  HEPATIC:   Lab Results   Component Value Date    ALBUMIN 3.7 09/12/2023    PROTTOTAL 6.7 09/12/2023    ALT 16 09/12/2023    AST 18 09/12/2023    ALKPHOS 77 09/12/2023    BILITOTAL 1.1 (H) 09/12/2023     OTHER:   Lab Results   Component Value Date    OTILIO 9.5 09/12/2023       Anesthesia Plan    ASA " Status:  3       Anesthesia Type: Spinal.              Consents    Anesthesia Plan(s) and associated risks, benefits, and realistic alternatives discussed. Questions answered and patient/representative(s) expressed understanding.     - Discussed: Risks, Benefits and Alternatives for BOTH SEDATION and the PROCEDURE were discussed     - Discussed with:  Patient            Postoperative Care            Comments:    Other Comments: Consented for GETA if needed            Olimpia Quan MD

## 2023-11-21 NOTE — CONSULTS
[facility] MEDICINE CONSULT NOTE   Physician requesting consult: Syd Templeton*    Reason for consult: Postoperative medical management of medical co-morbidities as below    Assessment and Plan    Kylie Amaya is a 88 year old old female with a history of hypertension and reflux disease who underwent a right total hip arthroplasty.. [facility] [place] asked to evaluate patient for postoperative medical management as follows below. Please resume the home medications as reconciled and further noted below with ordered hold parameters.  Vital signs have been stable post-operatively including hemodynamically stable blood pressure and heart rate. Thank you for this consult; we will continue to follow this patient until discharge.    Right total hip arthroplasty  Routine postoperative cares  PT and OT  No history of DVT or PE  Prophylaxis to orthopedics  Pain control  Hopefully home in a.m. 11/22    Essential hypertension  Patient endorses only using Lasix as needed blood pressure  Ordered with specific parameters  Suspect will need to schedule it here due to stress of surgery etc.    GERD  Avoid NSAIDs  PPI    Vitamin D deficiency    Macular degeneration  Gets injections  Has trouble reading except large print  Nursing aware    History of prolonged QT interval  Avoid QT prolonging meds    Coronary artery disease  Continue aspirin  Continue statin    Remote history of tobacco abuse    History of drug-induced constipation    Osteoporosis    Procedure(s):  RIGHT TOTAL HIP ARTHROPLASTY -POSTERIOR APPROACH  Post-operative Day: Day of Surgery  Code status:Prior         Estimated Blood Loss:  150 mL    Hospital Problem List   No problem-specific Assessment & Plan notes found for this encounter.    Principal Problem:    S/P total right hip arthroplasty      -Reviewed the patient's preoperative H and P and updated missing elements.  -Home medication reconciliation has been reviewed.  Medications  have been ordered as noted from the home list and changes are documented above     HISTORY     Kylie Amaya is a 88 year old old female with a history of hypertension and coronary disease who presents to the hospital for total hip arthroplasty.  Please see the operative note for details of the surgery.  Please the H&P was reviewed by me for preoperative course.  Role of hospital medicine discussed and questions answered.  Currently the patient is doing well.  Not having any pain.  She denies sleep apnea diabetes DVT or PE history.  She does have a history of hypertension and uses medications as needed at home.  She states Lasix will be taken if her blood pressure is above 150.  She has a history of reflux disease and takes Protonix regularly.  No problems with dysphagia or dark tarry stools or unwanted weight loss.  No history of peptic ulcer disease that she is aware of.  She is a former smoker and did have coronary disease status post stenting.  No symptoms recently.  She has osteoporosis and does tend to get constipation    Past Medical History     Patient Active Problem List    Diagnosis Date Noted    S/P total right hip arthroplasty 11/21/2023     Priority: Medium    Essential hypertension 09/16/2023     Priority: Medium    Right hip pain 09/16/2023     Priority: Medium    Hiatal hernia 04/15/2022     Priority: Medium    Osteoporosis 09/16/2013     Priority: Medium    Diverticular disease of colon 09/26/2008     Priority: Medium        Surgical History   She  has a past surgical history that includes colonoscopy; Esophagoscopy, gastroscopy, duodenoscopy (EGD), combined (N/A, 11/06/2023); Total Hip Arthroplasty (Left, 10/23/2007); Tonsillectomy (1955); Hysterectomy total abdominal, bilateral salpingo-oophorectomy, combined (1977); Foot surgery (Left); breast biopsy, rt/lt; Elbow surgery (Right); Mohs micrographic procedure; and Cataract Extraction (Bilateral).     Past Surgical History:   Procedure Laterality  "Date    BREAST BIOPSY, RT/LT      x3    CATARACT EXTRACTION Bilateral     COLONOSCOPY      ELBOW SURGERY Right         ESOPHAGOSCOPY, GASTROSCOPY, DUODENOSCOPY (EGD), COMBINED N/A 2023    Procedure: ESOPHAGOGASTRODUODENOSCOPY WITH BIOPSY;  Surgeon: Christofer Bronson MD;  Location: [facility] Main OR    FOOT SURGERY Left     Bone spur removal    HYSTERECTOMY TOTAL ABDOMINAL, BILATERAL SALPINGO-OOPHORECTOMY, COMBINED      MOHS MICROGRAPHIC PROCEDURE      skin cancer removal on nose    TONSILLECTOMY      TOTAL HIP ARTHROPLASTY Left 10/23/2007       Family History    Reviewed, and father had colon cancer    Social History    Reviewed, and she  reports that she quit smoking about 46 years ago. Her smoking use included cigarettes. She started smoking about 66 years ago. She has never used smokeless tobacco. She reports current alcohol use. She reports that she does not use drugs.  Social History     Tobacco Use    Smoking status: Former     Types: Cigarettes     Start date:      Quit date:      Years since quittin.9    Smokeless tobacco: Never   Substance Use Topics    Alcohol use: Yes     Comment: 3-4 drinks per week       Allergies     Allergies   Allergen Reactions    Azithromycin Diarrhea     Bloody diarrhea    Codeine Nausea and Vomiting and Confusion    Oxycodone Nausea and Vomiting     \"Any oral pain medication causes nausea and vomiting\"-hospital made plan with topical medications-lidocaine & voltaren for the past 6 weeks    Propoxyphene Nausea and Vomiting and Confusion       Prior to Admission Medications      Medications Prior to Admission   Medication Sig Dispense Refill Last Dose    acetaminophen (TYLENOL) 325 MG tablet Take 3 tablets (975 mg) by mouth every 6 hours 180 tablet 0 2023 at 2000    alendronate (FOSAMAX) 70 MG tablet Take 70 mg by mouth every 7 days   2023    aspirin 81 MG EC tablet Take 81 mg by mouth daily Stopping 23 before surgery   " 11/14/2023    atorvastatin (LIPITOR) 40 MG tablet Take 40 mg by mouth daily   11/20/2023    carboxymethylcellulose PF (REFRESH PLUS) 0.5 % ophthalmic solution Place 1 drop into both eyes 3 times daily as needed for dry eyes   Unknown at prn    cetirizine (ZYRTEC) 5 MG tablet Take 5 mg by mouth daily   11/20/2023 at am    cholecalciferol (VITAMIN D3) 1250 mcg (13222 units) capsule Take 1,250 mcg by mouth every 7 days   Past Week    cyanocobalamin (CYANOCOBALAMIN) 1000 MCG/ML injection Inject 1 mL into the muscle every 30 days Takes on last day of month   10/31/2023    diclofenac (VOLTAREN) 1 % topical gel Apply 2 g topically 4 times daily 200 g 0 11/17/2023    furosemide (LASIX) 20 MG tablet Take 20 mg by mouth daily as needed (BP >150)   11/20/2023    lidocaine (XYLOCAINE) 5 % external ointment Apply topically 4 times daily as needed for moderate pain   Unknown at prn    ondansetron (ZOFRAN) 4 MG tablet Take 8 mg by mouth every 8 hours as needed for nausea   Unknown at prn    pantoprazole (PROTONIX) 20 MG EC tablet Take 20 mg by mouth 2 times daily   11/20/2023 at pm    traZODone (DESYREL) 50 MG tablet Take 50 mg by mouth at bedtime   11/20/2023       Review of Systems   A 12 point comprehensive review of systems was negative except as noted above.    OBJECTIVE         Physical Exam   Temp:  [97.2  F (36.2  C)-98.3  F (36.8  C)] 97.2  F (36.2  C)  Pulse:  [] 97  Resp:  [16-30] 20  BP: (109-176)/(53-91) 169/71  SpO2:  [96 %-99 %] 97 %  Body mass index is 19.53 kg/m .  GENERAL:  Alert, appears comfortable, in no acute distress, appears stated age   HEAD:  Normocephalic, without obvious abnormality, atraumatic   EYES:  PERRL, conjunctiva/corneas clear, no scleral icterus, EOM's intact   NECK: Supple, symmetrical, trachea midline   LUNGS:   Clear to auscultation bilaterally, no rales, rhonchi, or wheezing, symmetric chest rise on inhalation, respirations unlabored   HEART:  Regular rate and rhythm, S1 and S2  normal, no murmur, rub, or gallop    ABDOMEN:   Soft, non-tender, bowel sounds active all four quadrants, no masses, no organomegaly, no rebound or guarding   SKIN: Dry to touch, no exanthems in the visualized areas   NEURO: Alert, oriented x 4, moves all four extremities freely/spontaneously   PSYCH: Cooperative, behavior is appropriate        Imaging Reviewed Personally By Myself    Radiology Results:   Recent Results (from the past 24 hour(s))   XR Pelvis w Hip Port Right 1 View    Narrative    EXAM: XR PELVIS AND HIP PORTABLE RIGHT 1 VIEW  LOCATION: [facility]  DATE: 11/21/2023    INDICATION: Status post Hip surgery  COMPARISON: None.      Impression    IMPRESSION: Postop changes of a recent right total hip arthroplasty. No fracture. Prior left total hip arthroplasty.       Labs Reviewed Personally By Myself     Results for orders placed or performed during the hospital encounter of 11/21/23 (from the past 24 hour(s))   XR Pelvis w Hip Port Right 1 View    Narrative    EXAM: XR PELVIS AND HIP PORTABLE RIGHT 1 VIEW  LOCATION: [facility]  DATE: 11/21/2023    INDICATION: Status post Hip surgery  COMPARISON: None.      Impression    IMPRESSION: Postop changes of a recent right total hip arthroplasty. No fracture. Prior left total hip arthroplasty.     Most Recent 3 CBC's:  Recent Labs   Lab Test 09/12/23 0222   WBC 8.1   HGB 14.5   *        Most Recent 3 BMP's:  Recent Labs   Lab Test 09/12/23 0222      POTASSIUM 4.1   CHLORIDE 101   CO2 26   BUN 19   CR 0.88   ANIONGAP 14   OTILIO 9.5        Most Recent 2 LFT's:  Recent Labs   Lab Test 09/12/23 0222   AST 18   ALT 16   ALKPHOS 77   BILITOTAL 1.1*     Most Recent 3 INR's:No lab results found.    Preoperative Labs Reviewed Personally By Myself     Thank you for this consultation.  Appreciate the opportunity to participate in the care of Kylie NAZARIO Jose Luis, please feel free to contact us for any  questions or concerns.    Pérez Almeida MD  [facility] Medicine  [facility]  Phone: #[phone]    Tt 47 min

## 2023-11-21 NOTE — ANESTHESIA POSTPROCEDURE EVALUATION
Patient: Kylie Amaya    Procedure: Procedure(s):  RIGHT TOTAL HIP ARTHROPLASTY -POSTERIOR APPROACH       Anesthesia Type:  No value filed.    Note:  Disposition: Inpatient   Postop Pain Control: Uneventful            Sign Out: Well controlled pain   PONV: No   Neuro/Psych: Uneventful            Sign Out: Acceptable/Baseline neuro status   Airway/Respiratory: Uneventful            Sign Out: Acceptable/Baseline resp. status   CV/Hemodynamics: Uneventful            Sign Out: Acceptable CV status; No obvious hypovolemia; No obvious fluid overload   Other NRE:    DID A NON-ROUTINE EVENT OCCUR? No           Last vitals:  Vitals Value Taken Time   /66 11/21/23 1510   Temp 36.2  C (97.2  F) 11/21/23 1440   Pulse 85 11/21/23 1518   Resp 35 11/21/23 1517   SpO2 94 % 11/21/23 1518   Vitals shown include unfiled device data.    Electronically Signed By: Olimpia Quan MD  November 21, 2023  4:01 PM

## 2023-11-21 NOTE — INTERVAL H&P NOTE
I have reviewed the surgical (or preoperative) H&P that is linked to this encounter, and examined the patient. There are no significant changes    Syd Templeton MD

## 2023-11-22 ENCOUNTER — APPOINTMENT (OUTPATIENT)
Dept: OCCUPATIONAL THERAPY | Facility: CLINIC | Age: 88
DRG: 470 | End: 2023-11-22
Attending: STUDENT IN AN ORGANIZED HEALTH CARE EDUCATION/TRAINING PROGRAM
Payer: MEDICARE

## 2023-11-22 ENCOUNTER — APPOINTMENT (OUTPATIENT)
Dept: PHYSICAL THERAPY | Facility: CLINIC | Age: 88
DRG: 470 | End: 2023-11-22
Attending: STUDENT IN AN ORGANIZED HEALTH CARE EDUCATION/TRAINING PROGRAM
Payer: MEDICARE

## 2023-11-22 PROBLEM — Z96.641 S/P HIP REPLACEMENT, RIGHT: Status: ACTIVE | Noted: 2023-11-22

## 2023-11-22 LAB
ANION GAP SERPL CALCULATED.3IONS-SCNC: 7 MMOL/L (ref 7–15)
BUN SERPL-MCNC: 13.7 MG/DL (ref 8–23)
CALCIUM SERPL-MCNC: 8.9 MG/DL (ref 8.8–10.2)
CHLORIDE SERPL-SCNC: 102 MMOL/L (ref 98–107)
CREAT SERPL-MCNC: 0.63 MG/DL (ref 0.51–0.95)
DEPRECATED HCO3 PLAS-SCNC: 29 MMOL/L (ref 22–29)
EGFRCR SERPLBLD CKD-EPI 2021: 86 ML/MIN/1.73M2
GLUCOSE BLDC GLUCOMTR-MCNC: 152 MG/DL (ref 70–99)
GLUCOSE SERPL-MCNC: 131 MG/DL (ref 70–99)
HGB BLD-MCNC: 9.5 G/DL (ref 11.7–15.7)
POTASSIUM SERPL-SCNC: 4.5 MMOL/L (ref 3.4–5.3)
SODIUM SERPL-SCNC: 138 MMOL/L (ref 135–145)

## 2023-11-22 PROCEDURE — 85018 HEMOGLOBIN: CPT | Performed by: STUDENT IN AN ORGANIZED HEALTH CARE EDUCATION/TRAINING PROGRAM

## 2023-11-22 PROCEDURE — 258N000003 HC RX IP 258 OP 636: Performed by: INTERNAL MEDICINE

## 2023-11-22 PROCEDURE — 97535 SELF CARE MNGMENT TRAINING: CPT | Mod: GO

## 2023-11-22 PROCEDURE — 250N000013 HC RX MED GY IP 250 OP 250 PS 637: Performed by: STUDENT IN AN ORGANIZED HEALTH CARE EDUCATION/TRAINING PROGRAM

## 2023-11-22 PROCEDURE — 250N000013 HC RX MED GY IP 250 OP 250 PS 637: Performed by: HOSPITALIST

## 2023-11-22 PROCEDURE — 120N000001 HC R&B MED SURG/OB

## 2023-11-22 PROCEDURE — 250N000013 HC RX MED GY IP 250 OP 250 PS 637: Performed by: INTERNAL MEDICINE

## 2023-11-22 PROCEDURE — 97110 THERAPEUTIC EXERCISES: CPT | Mod: GP

## 2023-11-22 PROCEDURE — 258N000003 HC RX IP 258 OP 636: Performed by: STUDENT IN AN ORGANIZED HEALTH CARE EDUCATION/TRAINING PROGRAM

## 2023-11-22 PROCEDURE — 250N000009 HC RX 250: Performed by: STUDENT IN AN ORGANIZED HEALTH CARE EDUCATION/TRAINING PROGRAM

## 2023-11-22 PROCEDURE — 99232 SBSQ HOSP IP/OBS MODERATE 35: CPT | Performed by: INTERNAL MEDICINE

## 2023-11-22 PROCEDURE — 97161 PT EVAL LOW COMPLEX 20 MIN: CPT | Mod: GP

## 2023-11-22 PROCEDURE — 97530 THERAPEUTIC ACTIVITIES: CPT | Mod: GP

## 2023-11-22 PROCEDURE — 36415 COLL VENOUS BLD VENIPUNCTURE: CPT | Performed by: FAMILY MEDICINE

## 2023-11-22 PROCEDURE — 80048 BASIC METABOLIC PNL TOTAL CA: CPT | Performed by: FAMILY MEDICINE

## 2023-11-22 PROCEDURE — 97166 OT EVAL MOD COMPLEX 45 MIN: CPT | Mod: GO

## 2023-11-22 PROCEDURE — 250N000011 HC RX IP 250 OP 636: Performed by: STUDENT IN AN ORGANIZED HEALTH CARE EDUCATION/TRAINING PROGRAM

## 2023-11-22 PROCEDURE — 82962 GLUCOSE BLOOD TEST: CPT

## 2023-11-22 RX ORDER — SODIUM CHLORIDE 9 MG/ML
INJECTION, SOLUTION INTRAVENOUS CONTINUOUS
Status: ACTIVE | OUTPATIENT
Start: 2023-11-22 | End: 2023-11-22

## 2023-11-22 RX ORDER — CALCIUM CARBONATE 500 MG/1
1000 TABLET, CHEWABLE ORAL 2 TIMES DAILY PRN
Status: DISCONTINUED | OUTPATIENT
Start: 2023-11-22 | End: 2023-11-24 | Stop reason: HOSPADM

## 2023-11-22 RX ORDER — MAGNESIUM HYDROXIDE/ALUMINUM HYDROXICE/SIMETHICONE 120; 1200; 1200 MG/30ML; MG/30ML; MG/30ML
30 SUSPENSION ORAL EVERY 4 HOURS PRN
Status: DISCONTINUED | OUTPATIENT
Start: 2023-11-22 | End: 2023-11-24 | Stop reason: HOSPADM

## 2023-11-22 RX ADMIN — ALUMINUM HYDROXIDE, MAGNESIUM HYDROXIDE, AND DIMETHICONE 30 ML: 200; 20; 200 SUSPENSION ORAL at 22:12

## 2023-11-22 RX ADMIN — DICLOFENAC 2 G: 10 GEL TOPICAL at 22:16

## 2023-11-22 RX ADMIN — KETOROLAC TROMETHAMINE 15 MG: 15 INJECTION INTRAMUSCULAR; INTRAVENOUS at 03:29

## 2023-11-22 RX ADMIN — ACETAMINOPHEN 975 MG: 325 TABLET ORAL at 12:49

## 2023-11-22 RX ADMIN — ACETAMINOPHEN 975 MG: 325 TABLET ORAL at 20:26

## 2023-11-22 RX ADMIN — TRAZODONE HYDROCHLORIDE 50 MG: 50 TABLET ORAL at 22:13

## 2023-11-22 RX ADMIN — CETIRIZINE HYDROCHLORIDE 5 MG: 5 TABLET ORAL at 09:33

## 2023-11-22 RX ADMIN — DICLOFENAC 2 G: 10 GEL TOPICAL at 14:17

## 2023-11-22 RX ADMIN — PANTOPRAZOLE SODIUM 20 MG: 20 TABLET, DELAYED RELEASE ORAL at 05:53

## 2023-11-22 RX ADMIN — ONDANSETRON 4 MG: 2 INJECTION INTRAMUSCULAR; INTRAVENOUS at 03:39

## 2023-11-22 RX ADMIN — POLYETHYLENE GLYCOL 3350 17 G: 17 POWDER, FOR SOLUTION ORAL at 09:33

## 2023-11-22 RX ADMIN — HYDROXYZINE HYDROCHLORIDE 25 MG: 25 TABLET, FILM COATED ORAL at 01:55

## 2023-11-22 RX ADMIN — SENNOSIDES AND DOCUSATE SODIUM 1 TABLET: 8.6; 5 TABLET ORAL at 22:12

## 2023-11-22 RX ADMIN — SENNOSIDES AND DOCUSATE SODIUM 1 TABLET: 8.6; 5 TABLET ORAL at 09:34

## 2023-11-22 RX ADMIN — CALCIUM CARBONATE (ANTACID) CHEW TAB 500 MG 1000 MG: 500 CHEW TAB at 02:24

## 2023-11-22 RX ADMIN — PANTOPRAZOLE SODIUM 20 MG: 20 TABLET, DELAYED RELEASE ORAL at 15:49

## 2023-11-22 RX ADMIN — SODIUM CHLORIDE 250 ML: 9 INJECTION, SOLUTION INTRAVENOUS at 11:12

## 2023-11-22 RX ADMIN — ATORVASTATIN CALCIUM 40 MG: 40 TABLET, FILM COATED ORAL at 22:12

## 2023-11-22 RX ADMIN — LIDOCAINE: 50 OINTMENT TOPICAL at 22:48

## 2023-11-22 RX ADMIN — ASPIRIN 325 MG: 325 TABLET, DELAYED RELEASE ORAL at 09:34

## 2023-11-22 RX ADMIN — SODIUM CHLORIDE: 9 INJECTION, SOLUTION INTRAVENOUS at 11:13

## 2023-11-22 RX ADMIN — ONDANSETRON 4 MG: 2 INJECTION INTRAMUSCULAR; INTRAVENOUS at 20:19

## 2023-11-22 RX ADMIN — SODIUM CHLORIDE, POTASSIUM CHLORIDE, SODIUM LACTATE AND CALCIUM CHLORIDE: 600; 310; 30; 20 INJECTION, SOLUTION INTRAVENOUS at 06:00

## 2023-11-22 RX ADMIN — SODIUM CHLORIDE: 9 INJECTION, SOLUTION INTRAVENOUS at 14:14

## 2023-11-22 RX ADMIN — ACETAMINOPHEN 975 MG: 325 TABLET ORAL at 05:53

## 2023-11-22 RX ADMIN — CEFAZOLIN 1 G: 1 INJECTION, POWDER, FOR SOLUTION INTRAMUSCULAR; INTRAVENOUS at 05:52

## 2023-11-22 ASSESSMENT — ACTIVITIES OF DAILY LIVING (ADL)
ADLS_ACUITY_SCORE: 34
ADLS_ACUITY_SCORE: 33
DEPENDENT_IADLS:: CLEANING;COOKING
ADLS_ACUITY_SCORE: 34
ADLS_ACUITY_SCORE: 33
ADLS_ACUITY_SCORE: 34
ADLS_ACUITY_SCORE: 33
ADLS_ACUITY_SCORE: 34
ADLS_ACUITY_SCORE: 34
ADLS_ACUITY_SCORE: 33
ADLS_ACUITY_SCORE: 34
ADLS_ACUITY_SCORE: 34
ADLS_ACUITY_SCORE: 33

## 2023-11-22 NOTE — PROGRESS NOTES
11/22/23 0733   Appointment Info   Signing Clinician's Name / Credentials (OT) Iain Moyer OTR/L   Quick Adds   Quick Adds Certification   Living Environment   People in Home alone   Current Living Arrangements independent living facility   Transportation Anticipated family or friend will provide   Living Environment Comments Pt has 4WW, walkin shower w/ grab bars and shower chair, comfort height toilets   Self-Care   Usual Activity Tolerance good   Current Activity Tolerance moderate   Equipment Currently Used at Home grab bar, toilet;grab bar, tub/shower;walker, rolling   Fall history within last six months no   Activity/Exercise/Self-Care Comment Pt IND w/ ADLs and most IADLs  - gets assistance w/ meals/does not drive   General Information   Onset of Illness/Injury or Date of Surgery 11/21/23   Referring Physician Syd Templeton MD   Patient/Family Therapy Goal Statement (OT) go to TCU   Additional Occupational Profile Info/Pertinent History of Current Problem R JORDAN   Existing Precautions/Restrictions no hip IR;no hip ADD past midline;90 degree hip flexion   Right Upper Extremity (Weight-bearing Status) weight-bearing as tolerated (WBAT)   Cognitive Status Examination   Orientation Status orientation to person, place and time   Visual Perception   Impact of Vision Impairment on Function (Vision) macular degeneration   Sensory   Sensory Quick Adds sensation intact   Pain Assessment   Patient Currently in Pain Yes, see Vital Sign flowsheet   Posture   Posture not impaired   Range of Motion Comprehensive   General Range of Motion no range of motion deficits identified   Strength Comprehensive (MMT)   General Manual Muscle Testing (MMT) Assessment no strength deficits identified   Bed Mobility   Bed Mobility supine-sit;sit-supine   Comment (Bed Mobility) SBA-CGA   Transfers   Transfers bed-chair transfer;sit-stand transfer;toilet transfer;shower transfer   Transfer Comments SBA-CGA   Activities of Daily  Living   BADL Assessment/Intervention lower body dressing;toileting;bathing   Bathing Assessment/Intervention   Adams Level (Bathing) lower body;moderate assist (50% patient effort)   Lower Body Dressing Assessment/Training   Adams Level (Lower Body Dressing) lower body dressing skills;moderate assist (50% patient effort)   Toileting   Adams Level (Toileting) adjust/manage clothing;supervision   Clinical Impression   Criteria for Skilled Therapeutic Interventions Met (OT) Yes, treatment indicated   OT Diagnosis decreased ADLs   Influenced by the following impairments JORDAN   OT Problem List-Impairments impacting ADL activity tolerance impaired;mobility;pain;post-surgical precautions   Assessment of Occupational Performance 3-5 Performance Deficits   Identified Performance Deficits LE dressing/bathing, bed mobility, all transfers, toileting   Planned Therapy Interventions (OT) ADL retraining;bed mobility training;transfer training   Clinical Decision Making Complexity (OT) detailed assessment/moderate complexity   Risk & Benefits of therapy have been explained evaluation/treatment results reviewed;patient   OT Total Evaluation Time   OT Eval, Moderate Complexity Minutes (86759) 10   OT Goals   Therapy Frequency (OT) Daily   OT Predicted Duration/Target Date for Goal Attainment 11/28/23   OT Goals Lower Body Dressing;Bed Mobility;Transfers;Toilet Transfer/Toileting   OT: Lower Body Dressing Modified independent;using adaptive equipment;within precautions;Goal Met;Completed   OT: Bed Mobility Modified independent;supine to/from sitting;rolling;within precautions   OT: Transfer Modified independent;with assistive device;within precautions  (walkin shower)   OT: Toilet Transfer/Toileting Modified independent;toilet transfer;cleaning and garment management;within precautions;Goal Met;Completed   Interventions   Interventions Quick Adds Self-Care/Home Management   Self-Care/Home Management    Self-Care/Home Mgmt/ADL, Compensatory, Meal Prep Minutes (72851) 34   Symptoms Noted During/After Treatment (Meal Preparation/Planning Training) dizziness   Treatment Detail/Skilled Intervention Pt edu on hip px - demonstrated ability to complete ADLs w/in px. Pt instructed on bed mobility techniques - completed Mod I. Pt edu on compensatory strategies for LE dressing using reacher and sock aid - completed Mod I w/ AE. STS w/ SBA and FWW. Pt amb. 25 ft to BR w/ FWW and SBA; pt SOB w/ small amount of activity and needing seatd rest break. Edu on toilet transfer w/ grab bars - completed Mod I and toileted Mod I. Pt reported dizziness and SOB when standing at sink to wash hands. Pt amb to sit EOB and transferred sit>supine w/ Min A. Cues for PLB; BP 90/51 and HR 89. Dizziness improved w/in a few mins of being supine. RN notified. Pt edu on sleeping position - pt verbalized understanding.   OT Discharge Planning   OT Plan 11/28: WIS transfer, standing g/h, discuss reacher/sock aid for home   OT Discharge Recommendation (DC Rec)   (defer to ortho)   OT Rationale for DC Rec Pt has decreased activity tolerance but moves well overall. Pt having dizziness and SOB w/ activity in room. Pt may benefit from TCU to increase activity tolerance and safety w/ all ADLs.   OT Brief overview of current status SBA for ADLs   Total Session Time   Timed Code Treatment Minutes 34   Total Session Time (sum of timed and untimed services) 44

## 2023-11-22 NOTE — PROVIDER NOTIFICATION
"Pt stated that her hiatal hernia was \"acting up\" and requested form some Tums, sent request to Dr. ABUNDIO Wang via [employer] Orders received for PRN Tums.   "

## 2023-11-22 NOTE — PROGRESS NOTES
[facility] Orthopaedics Progress Note      Post-operative Day: 1 Day Post-Op    Procedure(s):  RIGHT TOTAL HIP ARTHROPLASTY -POSTERIOR APPROACH      Subjective:  Patient ambulated last night, difficulty with dizziness after a short walk  Restless legs and reflux symptoms have been bothering her  Nauseous with pain medications, did have episode of emesis   Ok with plan to avoid narcotics   Pain: moderate   Chest pain, SOB:  No      Objective:  Blood pressure (!) 148/62, pulse 95, temperature 98.3  F (36.8  C), temperature source Oral, resp. rate 18, height 1.524 m (5'), weight 45.4 kg (100 lb), SpO2 96%.    Patient Vitals for the past 24 hrs:   BP Temp Temp src Pulse Resp SpO2 Height Weight   11/22/23 0329 (!) 148/62 98.3  F (36.8  C) Oral 95 18 96 % -- --   11/21/23 2232 129/64 98.2  F (36.8  C) Oral 91 16 94 % -- --   11/21/23 1830 (!) 147/65 98.7  F (37.1  C) Oral 86 18 94 % -- --   11/21/23 1730 137/60 97.4  F (36.3  C) Axillary 81 18 92 % -- --   11/21/23 1630 126/70 98.2  F (36.8  C) Oral 86 18 99 % -- --   11/21/23 1600 (!) 154/71 98.2  F (36.8  C) Oral 72 18 92 % -- --   11/21/23 1530 (!) 149/67 98.1  F (36.7  C) Oral 78 20 96 % -- --   11/21/23 1516 -- -- -- -- -- 95 % -- --   11/21/23 1510 (!) 141/66 -- -- 86 20 93 % -- --   11/21/23 1500 (!) 144/65 -- -- 87 20 96 % -- --   11/21/23 1450 (!) 169/71 -- -- 97 20 97 % -- --   11/21/23 1440 109/53 97.2  F (36.2  C) -- 109 30 99 % -- --   11/21/23 1220 (!) 149/67 -- -- -- -- -- -- --   11/21/23 1135 (!) 176/91 98.3  F (36.8  C) Temporal 78 16 96 % 1.524 m (5') 45.4 kg (100 lb)       Wt Readings from Last 4 Encounters:   11/21/23 45.4 kg (100 lb)   11/06/23 45.4 kg (100 lb)   09/16/23 47.6 kg (105 lb)   09/12/23 47.6 kg (105 lb)         Motor function, sensation, and circulation intact   Yes  Wound status: incisions are clean dry and intact. Yes    Pertinent Labs   Lab Results: personally reviewed.     Recent Labs   Lab Test 11/22/23  0615  09/12/23 0222   WBC  --  8.1   HGB 9.5* 14.5   HCT  --  43.4   MCV  --  101*   PLT  --  328   NA  --  141       XR reviewed - no complications noted     Plan: Continue cares and rehabilitation              Anticoagulation protocol:  mg daily  x 40 days            Pain medications:  tylenol, aspirin, low dose oxycodone (ok to avoid if the patient would like), topical voltaren and lidocaine for her low back             Weight bearing status:  WBAT            Posterior hip precautions x 3 months - ok to sleep on left side with a pillow between her legs             Disposition:  Suspect patient will require another night in the hospital, may need TCU disposition pending PT this am.                  Report completed by:  Syd Templeton MD  Date: 11/22/2023  Time: 6:34 AM

## 2023-11-22 NOTE — PLAN OF CARE
Problem: Adult Inpatient Plan of Care  Goal: Optimal Comfort and Wellbeing  Outcome: Progressing  Intervention: Monitor Pain and Promote Comfort  Recent Flowsheet Documentation  Taken 11/21/2023 2331 by Gila Ellison RN  Pain Management Interventions:   medication (see MAR)   ambulation/increased activity   distraction   emotional support   food   rest  Taken 11/21/2023 2310 by Gila Ellison, RN  Pain Management Interventions:   medication (see MAR)   cold applied  Taken 11/21/2023 2049 by Gila Ellison RN     Goal Outcome Evaluation:  Patient vital signs are at baseline: Yes  Patient able to ambulate as they were prior to admission or with assist devices provided by therapies during their stay:  Had not met ambulation goal of 75 feet yet.  Patient MUST void prior to discharge:  Yes  Patient able to tolerate oral intake:  Yes  Pain has adequate pain control using Oral analgesics:  Yes  Does patient have an identified :  Yes  Has goal D/C date and time been discussed with patient:  Yes   Pt reported that she gets nausea with PRN narcotic usage and stated that she will not take them, stated that she prefers Tylenol and IV pain medications.     CNA was assisting pt with ambulation in the mejia; Pt reported to feel weak and light headed. Pt was placed in w/c and bought back to her room, water was give to drink. VSS on RA.     Pt wanted to sit in her recliner, Writer attempted to assisted her, pt sat at the edge of bed. Pt stated that she was dizzy. Pt was assisted back to a laying pt. At first pt stated that she was nausea , but then stated that she was nausea. Administered PRN Toradol and IV Zofran. VSS were taken. VSS on RA.

## 2023-11-22 NOTE — PROGRESS NOTES
11/22/23 1145   Appointment Info   Signing Clinician's Name / Credentials (PT) Madeleine Reese PT   Quick Adds   Quick Adds Certification   Living Environment   People in Home alone   Current Living Arrangements independent living facility   Home Accessibility no concerns   Self-Care   Usual Activity Tolerance good   Current Activity Tolerance moderate   Equipment Currently Used at Home walker, rolling   Activity/Exercise/Self-Care Comment Reports independent with all mobility using 4WW at baseline.   General Information   Onset of Illness/Injury or Date of Surgery 11/21/23   Referring Physician Dr Syd Templeton   Patient/Family Therapy Goals Statement (PT) No more pain   Pertinent History of Current Problem (include personal factors and/or comorbidities that impact the POC) Admit for R JORDAN. PMH includes L JORDAN about 15 years ago.   Existing Precautions/Restrictions no hip ADD past midline;90 degree hip flexion;no hip IR;weight bearing   Weight-Bearing Status - RLE weight-bearing as tolerated   Cognition   Affect/Mental Status (Cognition) WNL   Pain Assessment   Patient Currently in Pain Yes, see Vital Sign flowsheet   Bed Mobility   Impairments Contributing to Impaired Bed Mobility pain;decreased strength   Assistive Device (Bed Mobility) bed rails   Comment, (Bed Mobility) Supine>sit CGA.   Transfers   Impairments Contributing to Impaired Transfers pain;decreased strength   Comment, (Transfers) Sit<>stand CGA.   Gait/Stairs (Locomotion)   Witter Level (Gait) contact guard   Assistive Device (Gait) walker, front-wheeled   Distance in Feet (Gait) 5' bed>recliner   Pattern (Gait) step-to   Deviations/Abnormal Patterns (Gait) antalgic   Clinical Impression   Criteria for Skilled Therapeutic Intervention Yes, treatment indicated   PT Diagnosis (PT) Impaired functional mobility   Influenced by the following impairments weakness, pain   Functional limitations due to impairments transfers, gait   Clinical  Presentation (PT Evaluation Complexity) stable   Clinical Presentation Rationale Presents as medically diagnosed.   Clinical Decision Making (Complexity) low complexity   Planned Therapy Interventions (PT) gait training;home exercise program;patient/family education;transfer training;progressive activity/exercise;home program guidelines   Risk & Benefits of therapy have been explained evaluation/treatment results reviewed;care plan/treatment goals reviewed;risks/benefits reviewed;participants voiced agreement with care plan;participants included;patient   PT Total Evaluation Time   PT Eval, Low Complexity Minutes (47351) 15   Therapy Certification   Start of care date 11/22/23   Certification date from 11/22/23   Certification date to 12/20/23   Medical Diagnosis R JORDAN   Physical Therapy Goals   PT Frequency Daily   PT Predicted Duration/Target Date for Goal Attainment 11/28/23   PT Goals Transfers;Gait;PT Goal 1   PT: Transfers Modified independent;Sit to/from stand;Assistive device;Within precautions   PT: Gait Modified independent;Rolling walker;Within precautions;100 feet   PT: Goal 1 Independent with HEP following written handout.   Interventions   Interventions Quick Adds Gait Training;Therapeutic Activity;Therapeutic Procedure   Therapeutic Procedure/Exercise   Ther. Procedure: strength, endurance, ROM, flexibillity Minutes (58027) 15   Symptoms Noted During/After Treatment fatigue;increased pain   Treatment Detail/Skilled Intervention R JORDAN seated LAQ x 10, R JORDAN supine ther ex x 10. SBA for AP, GS, QS, HS, SAQ. Max A for hip abduction and SLR. Cues for technique.   Therapeutic Activity   Therapeutic Activities: dynamic activities to improve functional performance Minutes (91970) 10   Symptoms Noted During/After Treatment Increased pain;Fatigue   Treatment Detail/Skilled Intervention Supine>sit with HOB slightly raised and use of bed rail CGA with cues for sequence and safety. Sat EOB x 3 minutes CGA,  reports no dizziness. Sit<>stand edge of bed to FWW CGA with cues for safe hand placement and RLE positioning. Stood with FWW CGA x 2 minutes with cues for posture. Amb 5' with FWW bed>recliner CGA with cues for wbat, sequence and safety.   Gait Training   Treatment Detail/Skilled Intervention Did not ambulate further than to the chair as pt still getting IV bolus for dizzy, light-headed and low BP. Reports no symptoms during PT session today.   PT Discharge Planning   PT Plan R JORDAN protocol   PT Discharge Recommendation (DC Rec)   (defer to ortho care team)   PT Brief overview of current status Sit<>stand CGA, amb 5' with FWW CGA bed>recliner   PT Equipment Needed at Discharge walker, rolling   Total Session Time   Timed Code Treatment Minutes 25   Total Session Time (sum of timed and untimed services) 40   [facility]  OUTPATIENT PHYSICAL THERAPY EVALUATION  PLAN OF TREATMENT FOR OUTPATIENT REHABILITATION  (COMPLETE FOR INITIAL CLAIMS ONLY)  Patient's Last Name, First Name, M.I.  YOB: 1935  Jose LuisKylie  MANSI                        Provider's Name  [facility] Medical Record No.  [id number]                             Onset Date:  11/21/23   Start of Care Date:  11/22/23   Type:     _X_PT   ___OT   ___SLP Medical Diagnosis:  R JORDAN              PT Diagnosis:  Impaired functional mobility Visits from SOC:  1     See note for plan of treatment, functional goals and certification details    I CERTIFY THE NEED FOR THESE SERVICES FURNISHED UNDER        THIS PLAN OF TREATMENT AND WHILE UNDER MY CARE     (Physician co-signature of this document indicates review and certification of the therapy plan).

## 2023-11-22 NOTE — CONSULTS
Care Management Initial Consult    General Information  Assessment completed with: Patient, Children,    Type of CM/SW Visit: Initial Assessment    Primary Care Provider verified and updated as needed:     Readmission within the last 30 days:        Reason for Consult: discharge planning  Advance Care Planning:            Communication Assessment  Patient's communication style: spoken language (English or Bilingual)    Hearing Difficulty or Deaf: no   Wear Glasses or Blind: yes    Cognitive  Cognitive/Neuro/Behavioral: WDL                      Living Environment:   People in home: alone     Current living Arrangements: independent living facility      Able to return to prior arrangements: yes       Family/Social Support:  Care provided by: self, child(niels)  Provides care for:    Marital Status:   Children          Description of Support System: Supportive, Involved         Current Resources:   Patient receiving home care services: No     Community Resources: None  Equipment currently used at home: walker, rolling  Supplies currently used at home: None    Employment/Financial:  Employment Status: retired        Financial Concerns:     Referral to Financial Worker: No       Does the patient's insurance plan have a 3 day qualifying hospital stay waiver?  No    Lifestyle & Psychosocial Needs:  Social Determinants of Health     Food Insecurity: Not on file   Depression: Not on file   Housing Stability: Not on file   Tobacco Use: Medium Risk (11/22/2023)    Patient History     Smoking Tobacco Use: Former     Smokeless Tobacco Use: Never     Passive Exposure: Not on file   Financial Resource Strain: Not on file   Alcohol Use: Not on file   Transportation Needs: Not on file   Physical Activity: Not on file   Interpersonal Safety: Not on file   Stress: Not on file   Social Connections: Not on file       Functional Status:  Prior to admission patient needed assistance:   Dependent ADLs:: Ambulation-walker  Dependent  IADLs:: Cleaning, Cooking       Mental Health Status:      No    Chemical Dependency Status:      No           Values/Beliefs:  Spiritual, Cultural Beliefs, Taoism Practices, Values that affect care: no               Additional Information:  [facility] met and introduced self and CM services to Pt and daughter Karon who was visiting,  Recommendation for TCU.  Referrals sent to [facility] and [facility]. Both can accepted.  Pt would be private pay has no 3 day qualifying hospital stay at this time.  Pt is able to pay privately.  Pt wants to go to [facility] and [facility] secured bed for tomorrow.  Dtr will transport at 11:00 AM.  Dtr will call Aiden at [facility] to secure payment at [phone].  PAS completed.     MAXIME Dickinson

## 2023-11-22 NOTE — PLAN OF CARE
Patient vital signs are at baseline: Yes  Patient able to ambulate as they were prior to admission or with assist devices provided by therapies during their stay:  No,  Reason:  No. Pt complaints of lightheadedness when trying to ambulate  Patient MUST void prior to discharge:  No,  Reason:  Due to void  Patient able to tolerate oral intake:  Yes  Pain has adequate pain control using Oral analgesics:  Yes  Does patient have an identified :  Yes  Has goal D/C date and time been discussed with patient:  Yes

## 2023-11-22 NOTE — PROGRESS NOTES
[facility] Daily Progress Note    Assessment/Plan:  Right total hip arthroplasty 11/21  Routine postoperative cares  PT and OT  No history of DVT or PE  Prophylaxis to orthopedics  Pain control    Lightheadedness  Likely secondary to hypotension  Fluid bolus  Continue normal saline  Recheck orthostatic blood pressures     Essential hypertension with relative hypotension  Patient endorses only using Lasix as needed blood pressure  Ordered with specific parameters  Suspect will need to schedule it here due to stress of surgery etc.     Acute blood loss anemia  Monitor hemoglobin.    GERD  Avoid NSAIDs  PPI     Vitamin D deficiency     Macular degeneration  Gets injections  Has trouble reading except large print  Nursing aware     History of prolonged QT interval  Avoid QT prolonging meds     Coronary artery disease  Continue aspirin  Continue statin     Remote history of tobacco abuse     History of drug-induced constipation    Principal Problem:    S/P total right hip arthroplasty     LOS: 0 days     Subjective:  Complains of lightheadedness with standing this morning and had similar episode yesterday.  Denies any other shortness of breath, chest pain.  No urinary or bowel complaints.   acetaminophen  975 mg Oral Q8H    aspirin  325 mg Oral Daily    atorvastatin  40 mg Oral QPM    cetirizine  5 mg Oral Daily    gabapentin  300 mg Oral At Bedtime    pantoprazole  20 mg Oral BID AC    polyethylene glycol  17 g Oral Daily    senna-docusate  1 tablet Oral BID    sodium chloride (PF)  3 mL Intracatheter Q8H    traZODone  50 mg Oral At Bedtime       Objective:  Vital signs in last 24 hours:  Temp:  [97.2  F (36.2  C)-98.7  F (37.1  C)] 98.3  F (36.8  C)  Pulse:  [] 95  Resp:  [16-30] 18  BP: (109-176)/(53-91) 148/62  SpO2:  [92 %-99 %] 96 %  Weight:   [unfilled]    Intake/Output last 3 shifts:  I/O last 3 completed shifts:  In: 800 [I.V.:800]  Out: 800 [Urine:650; Blood:150]  Intake/Output this shift:  No intake/output  data recorded.    Review of Systems:   As per subjective, all others negative.    Physical Exam:    General Appearance:  Alert, cooperative, no distress, appears stated age   Head:  Normocephalic, without obvious abnormality, atraumatic   Eyes:  PERRL,    Neck: Supple   Back:   Symmetric, no curvature, ROM normal, no CVA tenderness   Lungs:   Clear to auscultation bilaterally, respirations unlabored   Chest Wall:  No tenderness or deformity   Heart:  Regular rate and rhythm, S1, S2 normal   Abdomen:   Soft, non tender, non distended, bowel sounds present, no guarding or rigidity   Extremities: RIGHT JORDAN   Skin: Skin color, texture, turgor normal, no rashes or lesions   Neurologic: Alert and oriented X 3, Moves all 4 extremities       Lab Results:  Personally Reviewed.   Recent Results (from the past 24 hour(s))   Glucose by meter    Collection Time: 11/22/23  3:45 AM   Result Value Ref Range    GLUCOSE BY METER POCT 152 (H) 70 - 99 mg/dL   Hemoglobin    Collection Time: 11/22/23  6:15 AM   Result Value Ref Range    Hemoglobin 9.5 (L) 11.7 - 15.7 g/dL   Basic metabolic panel    Collection Time: 11/22/23  6:15 AM   Result Value Ref Range    Sodium 138 135 - 145 mmol/L    Potassium 4.5 3.4 - 5.3 mmol/L    Chloride 102 98 - 107 mmol/L    Carbon Dioxide (CO2) 29 22 - 29 mmol/L    Anion Gap 7 7 - 15 mmol/L    Urea Nitrogen 13.7 8.0 - 23.0 mg/dL    Creatinine 0.63 0.51 - 0.95 mg/dL    GFR Estimate 86 >60 mL/min/1.73m2    Calcium 8.9 8.8 - 10.2 mg/dL    Glucose 131 (H) 70 - 99 mg/dL         Shaniqua Pedersen M.D  [facility]  Internal Medicine

## 2023-11-23 ENCOUNTER — APPOINTMENT (OUTPATIENT)
Dept: PHYSICAL THERAPY | Facility: CLINIC | Age: 88
DRG: 470 | End: 2023-11-23
Attending: STUDENT IN AN ORGANIZED HEALTH CARE EDUCATION/TRAINING PROGRAM
Payer: MEDICARE

## 2023-11-23 ENCOUNTER — APPOINTMENT (OUTPATIENT)
Dept: ULTRASOUND IMAGING | Facility: CLINIC | Age: 88
DRG: 470 | End: 2023-11-23
Attending: INTERNAL MEDICINE
Payer: MEDICARE

## 2023-11-23 LAB
ABO/RH(D): NORMAL
ANTIBODY SCREEN: NEGATIVE
BLD PROD TYP BPU: NORMAL
BLOOD COMPONENT TYPE: NORMAL
CODING SYSTEM: NORMAL
CROSSMATCH: NORMAL
GLUCOSE SERPL-MCNC: 110 MG/DL (ref 70–99)
HGB BLD-MCNC: 8.1 G/DL (ref 11.7–15.7)
ISSUE DATE AND TIME: NORMAL
SPECIMEN EXPIRATION DATE: NORMAL
UNIT ABO/RH: NORMAL
UNIT NUMBER: NORMAL
UNIT STATUS: NORMAL
UNIT TYPE ISBT: 6200

## 2023-11-23 PROCEDURE — 82947 ASSAY GLUCOSE BLOOD QUANT: CPT | Performed by: STUDENT IN AN ORGANIZED HEALTH CARE EDUCATION/TRAINING PROGRAM

## 2023-11-23 PROCEDURE — 250N000013 HC RX MED GY IP 250 OP 250 PS 637: Performed by: STUDENT IN AN ORGANIZED HEALTH CARE EDUCATION/TRAINING PROGRAM

## 2023-11-23 PROCEDURE — 99232 SBSQ HOSP IP/OBS MODERATE 35: CPT | Performed by: INTERNAL MEDICINE

## 2023-11-23 PROCEDURE — P9016 RBC LEUKOCYTES REDUCED: HCPCS | Performed by: PHYSICIAN ASSISTANT

## 2023-11-23 PROCEDURE — 250N000013 HC RX MED GY IP 250 OP 250 PS 637: Performed by: INTERNAL MEDICINE

## 2023-11-23 PROCEDURE — 86923 COMPATIBILITY TEST ELECTRIC: CPT | Performed by: PHYSICIAN ASSISTANT

## 2023-11-23 PROCEDURE — 97110 THERAPEUTIC EXERCISES: CPT | Mod: GP | Performed by: PHYSICAL THERAPIST

## 2023-11-23 PROCEDURE — 85018 HEMOGLOBIN: CPT | Performed by: STUDENT IN AN ORGANIZED HEALTH CARE EDUCATION/TRAINING PROGRAM

## 2023-11-23 PROCEDURE — 120N000001 HC R&B MED SURG/OB

## 2023-11-23 PROCEDURE — 97116 GAIT TRAINING THERAPY: CPT | Mod: GP | Performed by: PHYSICAL THERAPIST

## 2023-11-23 PROCEDURE — 93970 EXTREMITY STUDY: CPT

## 2023-11-23 PROCEDURE — 250N000011 HC RX IP 250 OP 636: Performed by: STUDENT IN AN ORGANIZED HEALTH CARE EDUCATION/TRAINING PROGRAM

## 2023-11-23 PROCEDURE — 36415 COLL VENOUS BLD VENIPUNCTURE: CPT | Performed by: STUDENT IN AN ORGANIZED HEALTH CARE EDUCATION/TRAINING PROGRAM

## 2023-11-23 PROCEDURE — 86901 BLOOD TYPING SEROLOGIC RH(D): CPT | Performed by: PHYSICIAN ASSISTANT

## 2023-11-23 RX ORDER — FERROUS SULFATE 325(65) MG
325 TABLET ORAL EVERY OTHER DAY
Qty: 60 TABLET | Refills: 0 | Status: SHIPPED | OUTPATIENT
Start: 2023-11-23

## 2023-11-23 RX ADMIN — ALUMINUM HYDROXIDE, MAGNESIUM HYDROXIDE, AND DIMETHICONE 30 ML: 200; 20; 200 SUSPENSION ORAL at 22:54

## 2023-11-23 RX ADMIN — ACETAMINOPHEN 975 MG: 325 TABLET ORAL at 20:07

## 2023-11-23 RX ADMIN — ACETAMINOPHEN 975 MG: 325 TABLET ORAL at 12:43

## 2023-11-23 RX ADMIN — DICLOFENAC 2 G: 10 GEL TOPICAL at 21:18

## 2023-11-23 RX ADMIN — PANTOPRAZOLE SODIUM 20 MG: 20 TABLET, DELAYED RELEASE ORAL at 16:15

## 2023-11-23 RX ADMIN — LIDOCAINE: 50 OINTMENT TOPICAL at 21:18

## 2023-11-23 RX ADMIN — ALUMINUM HYDROXIDE, MAGNESIUM HYDROXIDE, AND DIMETHICONE 30 ML: 200; 20; 200 SUSPENSION ORAL at 17:24

## 2023-11-23 RX ADMIN — PANTOPRAZOLE SODIUM 20 MG: 20 TABLET, DELAYED RELEASE ORAL at 06:21

## 2023-11-23 RX ADMIN — ATORVASTATIN CALCIUM 40 MG: 40 TABLET, FILM COATED ORAL at 20:08

## 2023-11-23 RX ADMIN — TRAZODONE HYDROCHLORIDE 50 MG: 50 TABLET ORAL at 20:07

## 2023-11-23 RX ADMIN — ASPIRIN 325 MG: 325 TABLET, DELAYED RELEASE ORAL at 08:55

## 2023-11-23 RX ADMIN — CETIRIZINE HYDROCHLORIDE 5 MG: 5 TABLET ORAL at 08:55

## 2023-11-23 RX ADMIN — ONDANSETRON 4 MG: 4 TABLET, ORALLY DISINTEGRATING ORAL at 14:04

## 2023-11-23 RX ADMIN — ACETAMINOPHEN 975 MG: 325 TABLET ORAL at 06:20

## 2023-11-23 ASSESSMENT — ACTIVITIES OF DAILY LIVING (ADL)
ADLS_ACUITY_SCORE: 29
ADLS_ACUITY_SCORE: 30
ADLS_ACUITY_SCORE: 29
ADLS_ACUITY_SCORE: 34
ADLS_ACUITY_SCORE: 26
ADLS_ACUITY_SCORE: 29
ADLS_ACUITY_SCORE: 30
ADLS_ACUITY_SCORE: 34
ADLS_ACUITY_SCORE: 30

## 2023-11-23 NOTE — PLAN OF CARE
Patient vital signs are at baseline: Yes  Patient able to ambulate as they were prior to admission or with assist devices provided by therapies during their stay:  No,  Reason:  pt has been pivoting to the commode with assist of 1. Pt plans to work with therapies this afternoon.   Patient MUST void prior to discharge:  Yes, pt had a BM this shift.   Patient able to tolerate oral intake:  Yes  Pain has adequate pain control using Oral analgesics:  Yes, pain was controlled with scheduled tylenol.   Does patient have an identified :  Yes  Has goal D/C date and time been discussed with patient:  Yes    Pt has 1 unit of RBC's transfusing. Pt states that she is starting to feel much better then she was this morning. Pt stated that she was feeling nauseous, PO Zofran was given, pt stated that she feels like it was from lunch. Aquacell is CDI, no dizziness reported this shift. Pt underwent a bilateral lower extremity ultrasound. Plan is to discharge to TCU Tomorrow if medically cleared.     Blood transfusion rate was verified with on coming RN to ensure accuracy and transfusion of product.

## 2023-11-23 NOTE — PROGRESS NOTES
[facility] Orthopaedics Progress Note      Post-operative Day: 2 Days Post-Op    Procedure(s):  RIGHT TOTAL HIP ARTHROPLASTY -POSTERIOR APPROACH      Subjective:    Pain: minimal  Chest pain, SOB:  No    Pt reports fatigue, feels weak and has had some shortness of breath. Admits to feeling dizzy when out of bed. IV fluid bolus yesterday. Hgb dropped from 9.5 to 8.1 today.    Discontinue to TCU arranged for today at 1100    Objective:  Blood pressure (!) 144/62, pulse 100, temperature 98.8  F (37.1  C), temperature source Oral, resp. rate 18, height 1.524 m (5'), weight 45.4 kg (100 lb), SpO2 92%.    Patient Vitals for the past 24 hrs:   BP Temp Temp src Pulse Resp SpO2   11/23/23 0828 (!) 144/62 98.8  F (37.1  C) Oral 100 18 92 %   11/22/23 2345 127/60 98.2  F (36.8  C) Oral 105 18 92 %   11/22/23 2017 133/62 99.1  F (37.3  C) Oral 104 18 94 %   11/22/23 1553 126/59 98.5  F (36.9  C) Oral 104 18 95 %   11/22/23 0953 105/53 98.3  F (36.8  C) Oral 87 16 94 %       Wt Readings from Last 4 Encounters:   11/21/23 45.4 kg (100 lb)   11/06/23 45.4 kg (100 lb)   09/16/23 47.6 kg (105 lb)   09/12/23 47.6 kg (105 lb)         Motor function, sensation, and circulation intact   Yes  Wound status: incisions are clean dry and intact. Yes- no drainage. No bleeding onto surgical dressing. No evidence of hematoma at surgical site  Calf tenderness: Bilateral  No    Pertinent Labs   Lab Results: personally reviewed.     Recent Labs   Lab Test 11/23/23  0718 11/22/23  0615 09/12/23  0222   WBC  --   --  8.1   HGB 8.1* 9.5* 14.5   HCT  --   --  43.4   MCV  --   --  101*   PLT  --   --  328   NA  --  138 141       Plan: 1 unit PRBC today and hold discharge. Consent for blood products reviewed and discussed with the pt. Discussed with pt daughter, Karon, via phone who is also in agreement.    Anticoagulation protocol:  mg daily  x 420  days            Pain medications:  scopainmedication: tylenol            Weight bearing status:   WBAT            Disposition:  hold discontinue to TCU today.              Continue cares and rehabilitation     Report completed by:  Vickie Solo PA-C  Date: 11/23/2023  Time: 9:03 AM=

## 2023-11-23 NOTE — PROGRESS NOTES
Care Management Follow Up    Length of Stay (days): 1    Expected Discharge Date: 11/23/2023     Concerns to be Addressed: discharge planning     Patient plan of care discussed at interdisciplinary rounds: Yes    Anticipated Discharge Disposition: Transitional Care     Anticipated Discharge Services: None  Anticipated Discharge DME: None    Patient/family educated on Medicare website which has current facility and service quality ratings: yes  Education Provided on the Discharge Plan: Yes  Patient/Family in Agreement with the Plan:      Referrals Placed by CM/SW: Post Acute Facilities  Private pay costs discussed: private room/amenity fees    Additional Information:  Patient was scheduled to discharge today as a private pay resident to [facility]. This writer received a call from patient's nurse this a.m. indicating that patient will be receiving blood today and that we would need to delay the discharge until tomorrow. This writer called patient's daughter Karon, phone [phone] and left message with this update. This writer also spoke with nurse Dre at [facility] and provided her an update.    Will assess on Friday if patient is ready for discharge.     KIANNA Tinoco

## 2023-11-23 NOTE — PLAN OF CARE
"Goal Outcome Evaluation:    Problem: Adult Inpatient Plan of Care  Goal: Optimal Comfort and Wellbeing  Intervention: Monitor Pain and Promote Comfort      Patient vital signs are at baseline: Elevated Temp of 99.1 at the beginning of shift.   Patient able to ambulate as they were prior to admission or with assist devices provided by therapies during their stay:  Pt stated that she has felt \"dizzy\" when attempted to ambulated.   Patient MUST void prior to discharge:  Yes  Patient able to tolerate oral intake:  Yes  Pain has adequate pain control using Oral analgesics:  Yes  Does patient have an identified :  Yes  Has goal D/C date and time been discussed with patient:  Yes, discharge plans for TCU. Daughter Karon will  at 1100 hours.                             "

## 2023-11-23 NOTE — PROGRESS NOTES
[facility] Daily Progress Note    Assessment/Plan:  Right total hip arthroplasty 11/21  Routine postoperative cares  PT and OT  No history of DVT or PE  DVT prophylaxis per orthopedics  Pain control  PT limited by lightheadedness.  Will get venous Dopplers to rule out any DVT.    Lightheadedness  Likely secondary to hypotension  Persistent symptoms in spite of IV fluids  Receiving PRBC transfusion today.    Essential hypertension with relative hypotension  Monitor blood pressure  Lasix as needed at home     Acute blood loss anemia  Plan for 1 unit PRBC transfusion.    GERD  Avoid NSAIDs  PPI     Vitamin D deficiency     Macular degeneration  Gets injections     History of prolonged QT interval  Avoid QT prolonging meds     Coronary artery disease  Continue aspirin  Continue statin     Remote history of tobacco abuse     History of drug-induced constipation    Principal Problem:    S/P total right hip arthroplasty  Active Problems:    S/P hip replacement, right     LOS: 0 days     Subjective:  Complains of lightheadedness with standing not do physical therapy yesterday.  Denies any other shortness of breath, chest pain.  No urinary or bowel complaints.   acetaminophen  975 mg Oral Q8H    aspirin  325 mg Oral Daily    atorvastatin  40 mg Oral QPM    cetirizine  5 mg Oral Daily    gabapentin  300 mg Oral At Bedtime    pantoprazole  20 mg Oral BID AC    polyethylene glycol  17 g Oral Daily    senna-docusate  1 tablet Oral BID    sodium chloride (PF)  3 mL Intracatheter Q8H    traZODone  50 mg Oral At Bedtime       Objective:  Vital signs in last 24 hours:  Temp:  [98.2  F (36.8  C)-99.1  F (37.3  C)] 98.8  F (37.1  C)  Pulse:  [100-105] 100  Resp:  [18] 18  BP: (126-144)/(59-62) 144/62  SpO2:  [92 %-95 %] 92 %  Weight:   [unfilled]    Intake/Output last 3 shifts:  I/O last 3 completed shifts:  In: 770 [P.O.:420; I.V.:350]  Out: 800 [Urine:800]  Intake/Output this shift:  No intake/output data recorded.    Review of Systems:    As per subjective, all others negative.    Physical Exam:    General Appearance:  Alert, cooperative, no distress, appears stated age   Head:  Normocephalic, without obvious abnormality, atraumatic   Eyes:  PERRL,    Neck: Supple   Back:   Symmetric, no curvature, ROM normal, no CVA tenderness   Lungs:   Clear to auscultation bilaterally, respirations unlabored   Chest Wall:  No tenderness or deformity   Heart:  Regular rate and rhythm, S1, S2 normal   Abdomen:   Soft, non tender, non distended, bowel sounds present, no guarding or rigidity   Extremities: S/p right JORDAN   Skin: Skin color, texture, turgor normal, no rashes or lesions   Neurologic: Alert and oriented X 3, Moves all 4 extremities       Lab Results:  Personally Reviewed.   Recent Results (from the past 24 hour(s))   Hemoglobin    Collection Time: 11/23/23  7:18 AM   Result Value Ref Range    Hemoglobin 8.1 (L) 11.7 - 15.7 g/dL   Glucose    Collection Time: 11/23/23  7:18 AM   Result Value Ref Range    Glucose 110 (H) 70 - 99 mg/dL   Adult Type and Screen    Collection Time: 11/23/23  7:18 AM   Result Value Ref Range    ABO/RH(D) A POS     Antibody Screen Negative Negative    SPECIMEN EXPIRATION DATE [id number]    Prepare red blood cells (unit)    Collection Time: 11/23/23  8:25 AM   Result Value Ref Range    Blood Component Type Red Blood Cells     Product Code [id number]     Unit Status Ready for issue     Unit Number [id number]     CROSSMATCH Compatible     CODING SYSTEM [id number]          Shaniqua Pedersen M.D  [facility]  Internal Medicine

## 2023-11-24 ENCOUNTER — APPOINTMENT (OUTPATIENT)
Dept: OCCUPATIONAL THERAPY | Facility: CLINIC | Age: 88
DRG: 470 | End: 2023-11-24
Attending: STUDENT IN AN ORGANIZED HEALTH CARE EDUCATION/TRAINING PROGRAM
Payer: MEDICARE

## 2023-11-24 ENCOUNTER — APPOINTMENT (OUTPATIENT)
Dept: PHYSICAL THERAPY | Facility: CLINIC | Age: 88
DRG: 470 | End: 2023-11-24
Attending: STUDENT IN AN ORGANIZED HEALTH CARE EDUCATION/TRAINING PROGRAM
Payer: MEDICARE

## 2023-11-24 VITALS
OXYGEN SATURATION: 91 % | RESPIRATION RATE: 16 BRPM | DIASTOLIC BLOOD PRESSURE: 73 MMHG | HEIGHT: 60 IN | TEMPERATURE: 98.2 F | HEART RATE: 90 BPM | WEIGHT: 100 LBS | BODY MASS INDEX: 19.63 KG/M2 | SYSTOLIC BLOOD PRESSURE: 165 MMHG

## 2023-11-24 LAB
ANION GAP SERPL CALCULATED.3IONS-SCNC: 6 MMOL/L (ref 7–15)
BUN SERPL-MCNC: 9.7 MG/DL (ref 8–23)
CALCIUM SERPL-MCNC: 8.8 MG/DL (ref 8.8–10.2)
CHLORIDE SERPL-SCNC: 103 MMOL/L (ref 98–107)
CREAT SERPL-MCNC: 0.5 MG/DL (ref 0.51–0.95)
DEPRECATED HCO3 PLAS-SCNC: 31 MMOL/L (ref 22–29)
EGFRCR SERPLBLD CKD-EPI 2021: >90 ML/MIN/1.73M2
GLUCOSE SERPL-MCNC: 107 MG/DL (ref 70–99)
HGB BLD-MCNC: 10.3 G/DL (ref 11.7–15.7)
POTASSIUM SERPL-SCNC: 3.9 MMOL/L (ref 3.4–5.3)
SODIUM SERPL-SCNC: 140 MMOL/L (ref 135–145)

## 2023-11-24 PROCEDURE — 85018 HEMOGLOBIN: CPT | Performed by: INTERNAL MEDICINE

## 2023-11-24 PROCEDURE — 36415 COLL VENOUS BLD VENIPUNCTURE: CPT | Performed by: INTERNAL MEDICINE

## 2023-11-24 PROCEDURE — 250N000013 HC RX MED GY IP 250 OP 250 PS 637: Performed by: HOSPITALIST

## 2023-11-24 PROCEDURE — 250N000013 HC RX MED GY IP 250 OP 250 PS 637: Performed by: STUDENT IN AN ORGANIZED HEALTH CARE EDUCATION/TRAINING PROGRAM

## 2023-11-24 PROCEDURE — 97116 GAIT TRAINING THERAPY: CPT | Mod: GP

## 2023-11-24 PROCEDURE — 97110 THERAPEUTIC EXERCISES: CPT | Mod: GP

## 2023-11-24 PROCEDURE — 99232 SBSQ HOSP IP/OBS MODERATE 35: CPT | Performed by: INTERNAL MEDICINE

## 2023-11-24 PROCEDURE — 97535 SELF CARE MNGMENT TRAINING: CPT | Mod: GO

## 2023-11-24 PROCEDURE — 80048 BASIC METABOLIC PNL TOTAL CA: CPT | Performed by: INTERNAL MEDICINE

## 2023-11-24 RX ORDER — AMOXICILLIN 250 MG
1 CAPSULE ORAL
Status: DISCONTINUED | OUTPATIENT
Start: 2023-11-24 | End: 2023-11-24 | Stop reason: HOSPADM

## 2023-11-24 RX ORDER — CALCIUM CARBONATE 500 MG/1
1 TABLET, CHEWABLE ORAL 3 TIMES DAILY PRN
DISCHARGE
Start: 2023-11-24

## 2023-11-24 RX ADMIN — ASPIRIN 325 MG: 325 TABLET, DELAYED RELEASE ORAL at 09:22

## 2023-11-24 RX ADMIN — ACETAMINOPHEN 975 MG: 325 TABLET ORAL at 04:38

## 2023-11-24 RX ADMIN — PANTOPRAZOLE SODIUM 20 MG: 20 TABLET, DELAYED RELEASE ORAL at 06:48

## 2023-11-24 RX ADMIN — CALCIUM CARBONATE (ANTACID) CHEW TAB 500 MG 1000 MG: 500 CHEW TAB at 04:41

## 2023-11-24 RX ADMIN — CETIRIZINE HYDROCHLORIDE 5 MG: 5 TABLET ORAL at 09:22

## 2023-11-24 ASSESSMENT — ACTIVITIES OF DAILY LIVING (ADL)
ADLS_ACUITY_SCORE: 30
ADLS_ACUITY_SCORE: 26
ADLS_ACUITY_SCORE: 28
ADLS_ACUITY_SCORE: 30
ADLS_ACUITY_SCORE: 26
ADLS_ACUITY_SCORE: 30
ADLS_ACUITY_SCORE: 30

## 2023-11-24 NOTE — PROGRESS NOTES
S Daily Progress Note    Assessment/Plan:  Right total hip arthroplasty 11/21  Routine postoperative cares  PT and OT  No history of DVT or PE  DVT prophylaxis per orthopedics  Pain control  PT limited by lightheadedness.  venous Dopplers negative for DVT    Lightheadedness  Likely secondary to hypotension  Persistent symptoms in spite of IV fluids  Resolved with PRBCs transfusion    Diarrhea  Secondary to bowel medications  Changed to as needed.    Essential hypertension with relative hypotension  Monitor blood pressure  Lasix as needed at home     Acute blood loss anemia  Plan for 1 unit PRBC transfusion.    GERD  Avoid NSAIDs  PPI     Vitamin D deficiency     Macular degeneration  Gets injections     History of prolonged QT interval  Avoid QT prolonging meds     Coronary artery disease  Continue aspirin  Continue statin     Remote history of tobacco abuse     History of drug-induced constipation    Principal Problem:    S/P total right hip arthroplasty  Active Problems:    S/P hip replacement, right     LOS: 0 days     Subjective:  Complains of lightheadedness with standing not do physical therapy yesterday.  Denies any other shortness of breath, chest pain.  She had diarrhea multiple times overnight has abdominal cramping.  No nausea vomiting.   acetaminophen  975 mg Oral Q8H    aspirin  325 mg Oral Daily    atorvastatin  40 mg Oral QPM    cetirizine  5 mg Oral Daily    pantoprazole  20 mg Oral BID AC    [START ON 11/25/2023] polyethylene glycol  17 g Oral Daily    sodium chloride (PF)  3 mL Intracatheter Q8H    traZODone  50 mg Oral At Bedtime       Objective:  Vital signs in last 24 hours:  Temp:  [97  F (36.1  C)-98.8  F (37.1  C)] 98.2  F (36.8  C)  Pulse:  [] 90  Resp:  [16-17] 16  BP: (120-165)/(58-73) 165/73  SpO2:  [91 %-96 %] 91 %  Weight:   [unfilled]    Intake/Output last 3 shifts:  I/O last 3 completed shifts:  In: 350   Out: 300 [Urine:300]  Intake/Output this shift:  No intake/output data  recorded.    Review of Systems:   As per subjective, all others negative.    Physical Exam:    General Appearance:  Alert, cooperative, no distress, appears stated age   Head:  Normocephalic, without obvious abnormality, atraumatic   Lungs:   Clear to auscultation bilaterally, respirations unlabored   Chest Wall:  No tenderness or deformity   Heart:  Regular rate and rhythm, S1, S2 normal, systolic murmur   Abdomen:   Soft, non tender, non distended, bowel sounds present, no guarding or rigidity   Extremities: S/p right JORDAN   Skin: Skin color, texture, turgor normal, no rashes or lesions   Neurologic: Alert and oriented X 3, Moves all 4 extremities       Lab Results:  Personally Reviewed.   Recent Results (from the past 24 hour(s))   Basic metabolic panel    Collection Time: 11/24/23  8:46 AM   Result Value Ref Range    Sodium 140 135 - 145 mmol/L    Potassium 3.9 3.4 - 5.3 mmol/L    Chloride 103 98 - 107 mmol/L    Carbon Dioxide (CO2) 31 (H) 22 - 29 mmol/L    Anion Gap 6 (L) 7 - 15 mmol/L    Urea Nitrogen 9.7 8.0 - 23.0 mg/dL    Creatinine 0.50 (L) 0.51 - 0.95 mg/dL    GFR Estimate >90 >60 mL/min/1.73m2    Calcium 8.8 8.8 - 10.2 mg/dL    Glucose 107 (H) 70 - 99 mg/dL   Hemoglobin    Collection Time: 11/24/23  8:46 AM   Result Value Ref Range    Hemoglobin 10.3 (L) 11.7 - 15.7 g/dL         Shaniqua Pedersen M.D  [facility]  Internal Medicine

## 2023-11-24 NOTE — DISCHARGE SUMMARY
[facility] Discharge Summary                                       ZIGGY GOETZ [id number]   Age: 88 year old  PCP: Catrina Velasco, [phone] 1935     Date of Admission:  11/21/2023  Date of Discharge::  11/24/2023  Discharge Physician:  Syd Templeton MD    Code status:  Full Code    Admission Information:  Admission Diagnosis:  Osteoarthritis of right hip [M16.11]  S/P total right hip arthroplasty [Z96.641]  S/P hip replacement, right [Z96.641]    Post-Operative Day: 3 Days Post-Op     Reason for admission:  The patient was admitted for the following:Procedure(s) (LRB):  RIGHT TOTAL HIP ARTHROPLASTY -POSTERIOR APPROACH (Right)    Principal Problem:    S/P total right hip arthroplasty  Active Problems:    S/P hip replacement, right      Allergies:  Azithromycin, Codeine, Oxycodone, and Propoxyphene    Following the procedure noted above the patient was transferred to the post-op floor and started on:    Therapy:  PT and OT  Anticoagulation Plan:  mg for 40 days  Pain Management: tylenol, ice and topicals   Weight bearing status: Weight bearing as tolerated     The patient was followed and co-managed by the hospitalist service during the inpatient treatment course  Complications:  None  Consultations:  hospitalist      Pertinent Labs   Lab Results: personally reviewed.     Recent Labs   Lab Test 11/23/23  0718 11/22/23  0615 09/12/23  0222   WBC  --   --  8.1   HGB 8.1* 9.5* 14.5   HCT  --   --  43.4   MCV  --   --  101*   PLT  --   --  328   NA  --  138 141          Discharge Information:  Condition at discharge: Stable  Discharge destination:  Discharged to rehabilitation facility     Medications at discharge:  Current Discharge Medication List        START taking these medications    Details   calcium carbonate (TUMS) 500 MG chewable tablet Take 1 tablet (500 mg) by mouth 3 times daily as needed for heartburn    Associated Diagnoses: S/P hip replacement,  right      ferrous sulfate (FEROSUL) 325 (65 Fe) MG tablet Take 1 tablet (325 mg) by mouth every other day  Qty: 60 tablet, Refills: 0    Associated Diagnoses: S/P hip replacement, right      senna-docusate (SENOKOT-S/PERICOLACE) 8.6-50 MG tablet Take 1-2 tablets by mouth 2 times daily Take while on oral narcotics to prevent or treat constipation.  Qty: 30 tablet, Refills: 0    Comments: While taking narcotics  Associated Diagnoses: S/P hip replacement, right           CONTINUE these medications which have CHANGED    Details   acetaminophen (TYLENOL) 325 MG tablet Take 2 tablets (650 mg) by mouth every 4 hours as needed for other (mild pain)  Qty: 100 tablet, Refills: 0    Associated Diagnoses: S/P hip replacement, right      aspirin (ASA) 325 MG EC tablet Take 1 tablet (325 mg) by mouth daily  Qty: 40 tablet, Refills: 0    Associated Diagnoses: S/P hip replacement, right           CONTINUE these medications which have NOT CHANGED    Details   alendronate (FOSAMAX) 70 MG tablet Take 70 mg by mouth every 7 days      atorvastatin (LIPITOR) 40 MG tablet Take 40 mg by mouth daily      carboxymethylcellulose PF (REFRESH PLUS) 0.5 % ophthalmic solution Place 1 drop into both eyes 3 times daily as needed for dry eyes      cetirizine (ZYRTEC) 5 MG tablet Take 5 mg by mouth daily      cholecalciferol (VITAMIN D3) 1250 mcg (76055 units) capsule Take 1,250 mcg by mouth every 7 days      cyanocobalamin (CYANOCOBALAMIN) 1000 MCG/ML injection Inject 1 mL into the muscle every 30 days Takes on last day of month      diclofenac (VOLTAREN) 1 % topical gel Apply 2 g topically 4 times daily  Qty: 200 g, Refills: 0    Associated Diagnoses: Right hip pain      furosemide (LASIX) 20 MG tablet Take 20 mg by mouth daily as needed (BP >150)      lidocaine (XYLOCAINE) 5 % external ointment Apply topically 4 times daily as needed for moderate pain      ondansetron (ZOFRAN) 4 MG tablet Take 8 mg by mouth every 8 hours as needed for nausea       pantoprazole (PROTONIX) 20 MG EC tablet Take 20 mg by mouth 2 times daily      traZODone (DESYREL) 50 MG tablet Take 50 mg by mouth at bedtime                        Follow-Up Care:  Patient should be seen by Dr. Templeton's team 10-14 days from surgery. PT should start around 5-7 days post operatively.     Syd Templeton MD

## 2023-11-24 NOTE — PLAN OF CARE
Problem: Hip Arthroplasty  Goal: Optimal Functional Ability  Outcome: Progressing    Pt alert and oriented x 4. VSS. Lung sounds clear. Mando SOB, chest pain and N/V. Reports mild surgical hip pain. Improving with rest and ice. Will continue to monitor. Up with assist 1. Alarms on for safety.     Patient vital signs are at baseline: Yes  Patient able to ambulate as they were prior to admission or with assist devices provided by therapies during their stay:  Yes  Patient MUST void prior to discharge:  Yes  Patient able to tolerate oral intake:  Yes  Pain has adequate pain control using Oral analgesics:  Yes  Does patient have an identified :  Yes  Has goal D/C date and time been discussed with patient:  Yes

## 2023-11-24 NOTE — PROGRESS NOTES
Occupational Therapy Discharge Summary    Reason for therapy discharge:    Discharged to transitional care facility.    Progress towards therapy goal(s). See goals on Care Plan in [facility] electronic health record for goal details.  Goals partially met.  Barriers to achieving goals:   discharge from facility.    Therapy recommendation(s):    Continued therapy is recommended.  Rationale/Recommendations:  continue therapy to increase safety and independence w/ all ADLs.

## 2023-11-24 NOTE — PROGRESS NOTES
Physical Therapy Discharge Summary    Reason for therapy discharge:    Discharged to transitional care facility.    Progress towards therapy goal(s). See goals on Care Plan in [facility] electronic health record for goal details.  Goals partially met.  Barriers to achieving goals:   limited tolerance for therapy.    Therapy recommendation(s):    Continue home exercise program.

## 2023-11-24 NOTE — PROGRESS NOTES
Care Management Discharge Note    Discharge Date: 11/24/2023     Discharge Disposition: Transitional Care - [facility]    Discharge Services: None    Discharge DME: None    Discharge Transportation: family or friend will provide    Private pay costs discussed: private room/amenity fees    Does the patient's insurance plan have a 3 day qualifying hospital stay waiver?  No    PAS Confirmation Code: [id number]  Patient/family educated on Medicare website which has current facility and service quality ratings: yes    Education Provided on the Discharge Plan: Yes  Persons Notified of Discharge Plans: patient, daughters  Patient/Family in Agreement with the Plan:  yes    Handoff Referral Completed: Yes    Additional Information:  Patient is ready for discharge today. She is scheduled to go to [facility] at 1100 via family transport. This writer faxed discharge orders to facility and confirmed plan with admissions nurse.    GARIMA TinocoSW

## 2023-11-24 NOTE — PLAN OF CARE
Goal Outcome Evaluation:      Problem: Adult Inpatient Plan of Care  Goal: Optimal Comfort and Wellbeing  Intervention: Monitor Pain and Promote Comfort    Problem: Hip Arthroplasty  Goal: Acceptable Pain Control  Intervention: Prevent or Manage Pain    A&Ox4. VSS, on RA. CMS intact. IV saline locked. Dressing has small amount of drainage. Rated pain minimal. Pain managed with PRN scheduled Tylenols,ice therapy and essential oil. C/O of heartburn, Tums administered and crackers offered. Call light within reach.

## 2023-11-24 NOTE — PLAN OF CARE
Patient vital signs are at baseline: Yes  Patient able to ambulate as they were prior to admission or with assist devices provided by therapies during their stay:  Yes, pt a standby assist with the use of the walker and gait belt   Patient MUST void prior to discharge:  Yes, pt has had several loose stools today, stool softeners held.   Patient able to tolerate oral intake:  Yes  Pain has adequate pain control using Oral analgesics:  Yes  Does patient have an identified :  Yes  Has goal D/C date and time been discussed with patient:  Yes     Discharge AVS was reviewed with pt. Discharge packet was given to patient to give to TCU. Son in law Joey to transport.

## 2023-11-24 NOTE — PROGRESS NOTES
[facility] Progress Note      Post-operative Day: 3 Days Post-Op    Procedure(s):  RIGHT TOTAL HIP ARTHROPLASTY -POSTERIOR APPROACH      Subjective:  Received blood transfusion yesterday, one unit.   Dizziness resolved   Heartburn present   Patient having return of bowel function but in the form of diarrhea, not something she typically deals with   Pain: minimal  Chest pain, SOB:  No      Objective:  Blood pressure (!) 165/73, pulse 90, temperature 98.2  F (36.8  C), temperature source Oral, resp. rate 16, height 1.524 m (5'), weight 45.4 kg (100 lb), SpO2 91%.    Patient Vitals for the past 24 hrs:   BP Temp Temp src Pulse Resp SpO2   11/24/23 0749 (!) 165/73 98.2  F (36.8  C) Oral 90 16 91 %   11/24/23 0036 -- -- -- -- -- 91 %   11/24/23 0010 (!) 148/67 98.3  F (36.8  C) Oral 104 16 91 %   11/23/23 1615 (!) 160/70 97  F (36.1  C) Axillary 93 16 95 %   11/23/23 1515 120/58 98.2  F (36.8  C) Oral 83 16 96 %   11/23/23 1415 133/60 98.8  F (37.1  C) Oral 97 16 95 %   11/23/23 1405 (!) 148/64 98.3  F (36.8  C) Oral 91 17 95 %   11/23/23 1315 132/60 98.4  F (36.9  C) Oral 96 16 94 %   11/23/23 1300 130/61 98.2  F (36.8  C) Oral 95 17 92 %   11/23/23 1251 128/60 98.1  F (36.7  C) Oral 92 17 94 %   11/23/23 0828 (!) 144/62 98.8  F (37.1  C) Oral 100 18 92 %       Wt Readings from Last 4 Encounters:   11/21/23 45.4 kg (100 lb)   11/06/23 45.4 kg (100 lb)   09/16/23 47.6 kg (105 lb)   09/12/23 47.6 kg (105 lb)         Motor function, sensation, and circulation intact   Yes  Wound status: incisions are clean dry and intact. Small area of strikethrough       Pertinent Labs   Lab Results: personally reviewed.     Recent Labs   Lab Test 11/23/23  0718 11/22/23  0615 09/12/23  0222   WBC  --   --  8.1   HGB 8.1* 9.5* 14.5   HCT  --   --  43.4   MCV  --   --  101*   PLT  --   --  328   NA  --  138 141         Plan: Anticoagulation protocol:  mg daily  x 40 days            Pain medications:   tylenol, ice, and topicals for pain; narcotics available but the patient is not interested or needing narcotics            Weight bearing status:  WBAT            Posterior hip precautions x 3 months             Disposition: Discharge today to TCU pending medicine evaluation             Continue cares and rehabilitation     Report completed by:  Syd Templeton MD  Date: 11/24/2023  Time: 8:03 AM

## 2023-11-25 ENCOUNTER — PATIENT OUTREACH (OUTPATIENT)
Dept: CARE COORDINATION | Facility: CLINIC | Age: 88
End: 2023-11-25
Payer: MEDICARE

## 2023-11-25 NOTE — PROGRESS NOTES
[facility]    Background: Transitional Care Management program identified per system criteria and reviewed by Connected [facility] Resource Center team for possible outreach.    Assessment: Upon chart review, CCRC Team member will not proceed with patient outreach related to this episode of Transitional Care Management program due to reason below:    Non-[facility] TCU: CCRC team member noted patient discharged to TCU/ARU/LTACH. Patient is not established with a [facility] currently supported by Primary Care-Care Coordination therefore handoff to Primary Care-Care Coordination is not appropriate at this time.    Plan: Transitional Care Management episode addressed appropriately per reason noted above.      Lydia Mahoney MA  Connected Care Resource [place], [facility]    *Connected Care Resource Team does NOT follow patient ongoing. Referrals are identified based on internal discharge reports and the outreach is to ensure patient has an understanding of their discharge instructions.

## 2023-11-26 ENCOUNTER — TELEPHONE (OUTPATIENT)
Dept: GERIATRICS | Facility: CLINIC | Age: 88
End: 2023-11-26
Payer: MEDICARE

## 2023-11-26 ENCOUNTER — LAB REQUISITION (OUTPATIENT)
Dept: LAB | Facility: CLINIC | Age: 88
End: 2023-11-26
Payer: MEDICARE

## 2023-11-26 DIAGNOSIS — I10 ESSENTIAL (PRIMARY) HYPERTENSION: ICD-10-CM

## 2023-11-26 NOTE — TELEPHONE ENCOUNTER
[facility] TELEPHONE ENCOUNTER    Chief Complaint   Patient presents with    Abdominal Pain     Kylie Amaya is a 88 year old  (1935),Nurse called today to report: Increased complaints pain to mid abdomen. History of having a mid hiatal hernia with pain to abdomen which is known per daughter reports to RN. Increased reports of diarrhea as well, however staff has been giving senna scheduled per orders. Edema also present to bilateral lower extremities.     ASSESSMENT/PLAN    X ray of abdomen to rule out concerns   Change senna S to PRN vs scheduled given diarrhea  Start lymphedema therapy for bilateral lower extremities.   BMP and CBC due 11/27  Encourage PRN zofran and TUMS as directed      Electronically signed by:   VINEET Carbajal CNP

## 2023-11-27 ENCOUNTER — LAB REQUISITION (OUTPATIENT)
Dept: LAB | Facility: CLINIC | Age: 88
End: 2023-11-27
Payer: MEDICARE

## 2023-11-27 ENCOUNTER — TRANSITIONAL CARE UNIT VISIT (OUTPATIENT)
Dept: GERIATRICS | Facility: CLINIC | Age: 88
End: 2023-11-27
Payer: MEDICARE

## 2023-11-27 VITALS
HEART RATE: 86 BPM | WEIGHT: 109 LBS | BODY MASS INDEX: 21.4 KG/M2 | DIASTOLIC BLOOD PRESSURE: 77 MMHG | HEIGHT: 60 IN | RESPIRATION RATE: 16 BRPM | OXYGEN SATURATION: 95 % | SYSTOLIC BLOOD PRESSURE: 153 MMHG | TEMPERATURE: 98.1 F

## 2023-11-27 DIAGNOSIS — Z96.641 S/P HIP REPLACEMENT, RIGHT: Primary | ICD-10-CM

## 2023-11-27 DIAGNOSIS — R11.0 NAUSEA: ICD-10-CM

## 2023-11-27 DIAGNOSIS — M81.0 OSTEOPOROSIS, UNSPECIFIED OSTEOPOROSIS TYPE, UNSPECIFIED PATHOLOGICAL FRACTURE PRESENCE: ICD-10-CM

## 2023-11-27 DIAGNOSIS — E46 PROTEIN-CALORIE MALNUTRITION, UNSPECIFIED SEVERITY (H): ICD-10-CM

## 2023-11-27 DIAGNOSIS — H35.3232 EXUDATIVE AGE-RELATED MACULAR DEGENERATION OF BOTH EYES WITH INACTIVE CHOROIDAL NEOVASCULARIZATION (H): ICD-10-CM

## 2023-11-27 DIAGNOSIS — Z96.641 S/P TOTAL RIGHT HIP ARTHROPLASTY: ICD-10-CM

## 2023-11-27 DIAGNOSIS — R94.31 QT PROLONGATION: ICD-10-CM

## 2023-11-27 DIAGNOSIS — I10 ESSENTIAL (PRIMARY) HYPERTENSION: ICD-10-CM

## 2023-11-27 PROBLEM — K31.89 MASS OF STOMACH: Status: ACTIVE | Noted: 2023-11-03

## 2023-11-27 PROBLEM — C44.90 MALIGNANT NEOPLASM OF SKIN: Status: ACTIVE | Noted: 2019-10-25

## 2023-11-27 PROBLEM — E80.4 GILBERT'S SYNDROME: Status: ACTIVE | Noted: 2022-11-23

## 2023-11-27 PROBLEM — R93.89 ABNORMAL CT SCAN: Status: ACTIVE | Noted: 2023-11-27

## 2023-11-27 PROBLEM — J30.9 ALLERGIC RHINITIS: Status: ACTIVE | Noted: 2023-11-27

## 2023-11-27 PROBLEM — I25.10 CORONARY ATHEROSCLEROSIS: Status: ACTIVE | Noted: 2023-11-27

## 2023-11-27 PROBLEM — E78.5 HYPERLIPIDEMIA: Status: ACTIVE | Noted: 2023-11-27

## 2023-11-27 PROBLEM — R17 JAUNDICE: Status: ACTIVE | Noted: 2022-02-03

## 2023-11-27 PROBLEM — R17 INCREASED BILIRUBIN LEVEL: Status: ACTIVE | Noted: 2023-11-27

## 2023-11-27 PROCEDURE — 99305 1ST NF CARE MODERATE MDM 35: CPT | Performed by: FAMILY MEDICINE

## 2023-11-27 NOTE — PROGRESS NOTES
[facility] GERIATRIC SERVICES       Patient Kylie Amaya  MRN: [id number]        Reason for Visit     Chief Complaint   Patient presents with    Hospital F/U       Code Status     DNR / DNI    Assessment     Status post right total hip arthroplasty on 11/21/2023  Hypertension  GERD.    Macular degeneration  CAD  History of prolonged QT  Vitamin D deficiency  Generalized weakness  Osteoporoses    Plan     Pt is admitted to TCU for strengthening and rehab.  Pt admitted to TCU after undergoing rt hip surgery  Date of procedure- 11/21/23  Continue with incisional cares  WBAT  Follow-up with orthopedics as scheduled  Cont PT / OT for strengthening/ gait retraining  Pain management optimized  Tylenol scheduled 1 g every 8 hours  Continue narcotics prn-  Advised aggressive icing  On asa 325mg for dvt prophylaxis x40d  Duration to be determined by orthopedics  Tubigrip stockings recommended for management of swelling of the lower extremities   Doppler ultrasound ordered for the right lower extremity due to excessive swelling noted  Continue with her diuretics  Abdominal pain concerns reviewed discontinued stool softeners.  X-ray was negative.  Continue to monitor-on PPI and Zofran     Patient appears to have this is a chronic complaint she has seen GI for this  Ultrasound of her right upper quadrant in the past has been negative and GI suspects that she may have Gio's disease.  Continue to monitor and if her pain worsens she can follow-up with GI  Recheck labs  Continue with PT/OT    History     Patient is a very pleasant 88 year old female who is admitted to TCU  Patient was admitted electively for right total hip arthroplasty.  She underwent the procedure on 11/21/2023.  Postoperatively has been admitted to the TCU  Now reporting having abdominal pain with some distention and gas abdominal x-ray done was negative.  She believes she got an extra dose of stool softeners which contributed to her diarrhea and abdominal  pain    Past Medical & Surgical History     PAST MEDICAL HISTORY:   Past Medical History:   Diagnosis Date    Arthritis     Coronary artery disease     Gastroesophageal reflux disease     Gilbert's syndrome     Hiatal hernia     History of blood transfusion 1977    History of skin cancer     History of vertebral compression fracture 2015    Lumbar    Hyperlipidemia     Hypertension     Macular degeneration of both eyes     Limited Central Vision    Mild aortic stenosis     Osteoporosis     PONV (postoperative nausea and vomiting)     Prolonged QT interval     Slow to wake up after anesthesia     General anesthesia      PAST SURGICAL HISTORY:   has a past surgical history that includes colonoscopy; Esophagoscopy, gastroscopy, duodenoscopy (EGD), combined (N/A, 11/06/2023); Total Hip Arthroplasty (Left, 10/23/2007); Tonsillectomy (1955); Hysterectomy total abdominal, bilateral salpingo-oophorectomy, combined (1977); Foot surgery (Left); breast biopsy, rt/lt; Elbow surgery (Right); Mohs micrographic procedure; Cataract Extraction (Bilateral); and Arthroplasty hip (Right, 11/21/2023).      Past Social History     Reviewed,  reports that she quit smoking about 46 years ago. Her smoking use included cigarettes. She started smoking about 66 years ago. She has never used smokeless tobacco. She reports current alcohol use. She reports that she does not use drugs.    Family History     Reviewed, and family history is not on file.    Medication List     Current Outpatient Medications   Medication    acetaminophen (TYLENOL) 325 MG tablet    alendronate (FOSAMAX) 70 MG tablet    aspirin (ASA) 325 MG EC tablet    atorvastatin (LIPITOR) 40 MG tablet    calcium carbonate (TUMS) 500 MG chewable tablet    carboxymethylcellulose PF (REFRESH PLUS) 0.5 % ophthalmic solution    cetirizine (ZYRTEC) 5 MG tablet    cholecalciferol (VITAMIN D3) 1250 mcg (31825 units) capsule    cyanocobalamin (CYANOCOBALAMIN) 1000 MCG/ML injection     "diclofenac (VOLTAREN) 1 % topical gel    ferrous sulfate (FEROSUL) 325 (65 Fe) MG tablet    furosemide (LASIX) 20 MG tablet    lidocaine (XYLOCAINE) 5 % external ointment    ondansetron (ZOFRAN) 4 MG tablet    pantoprazole (PROTONIX) 20 MG EC tablet    senna-docusate (SENOKOT-S/PERICOLACE) 8.6-50 MG tablet    traZODone (DESYREL) 50 MG tablet     No current facility-administered medications for this visit.      MED REC REQUIRED  Post Medication Reconciliation Status:  Discharge medications reconciled, continue medications without change       Allergies     Allergies   Allergen Reactions    Azithromycin Diarrhea     Bloody diarrhea    Codeine Nausea and Vomiting and Confusion    Oxycodone Nausea and Vomiting     \"Any oral pain medication causes nausea and vomiting\"-hospital made plan with topical medications-lidocaine & voltaren for the past 6 weeks    Propoxyphene Nausea and Vomiting and Confusion       Review of Systems   A comprehensive review of 14 systems was done. Pertinent findings noted here and in history of present illness. All the rest negative.  Constitutional: Negative.  Negative for fever, chills, she has  activity change, appetite change and fatigue.   HENT: Negative for congestion and facial swelling.    Eyes: Negative for photophobia, redness and visual disturbance.   Respiratory: Negative for cough and chest tightness.    Cardiovascular: Negative for chest pain, palpitations and right greater than left leg swelling.   Reports gaining 11 pounds in the hospital  Gastrointestinal: Negative for nausea, diarrhea, constipation, blood in stool and abdominal distention.   Was having cramping with diarrhea earlier which she believes is due to stool softeners.  Now she is reporting minimal BMs and some abdominal pain with nausea she is not eating much  Genitourinary: Negative.    Musculoskeletal: Negative.  Reporting having minimal pain in her right hip  Skin: Negative.    Neurological: Negative for dizziness, " tremors, syncope, weakness, light-headedness and headaches.   Hematological: Does not bruise/bleed easily.   Psychiatric/Behavioral: Negative.        Physical Exam   BP (!) 153/77   Pulse 86   Temp 98.1  F (36.7  C)   Resp 16   Ht 1.524 m (5')   Wt 49.4 kg (109 lb)   SpO2 95%   BMI 21.29 kg/m       Constitutional: Oriented to person, place, and time and appears well-developed.   HEENT:  Normocephalic and atraumatic.  Eyes: Conjunctivae and EOM are normal. Pupils are equal, round, and reactive to light. No discharge.  No scleral icterus. Nose normal. Mouth/Throat: Oropharynx is clear and moist. No oropharyngeal exudate.    NECK: Normal range of motion. Neck supple. No JVD present. No tracheal deviation present. No thyromegaly present.   CARDIOVASCULAR: Normal rate, regular rhythm and intact distal pulses.  Exam reveals no gallop and no friction rub.  Systolic murmur present.  PULMONARY: Effort normal and breath sounds normal. No respiratory distress.No Wheezing or rales.  ABDOMEN: Soft. Bowel sounds are normal. No distension and no mass.  There is minimal right upper quadrant tenderness. There is no rebound and no guarding. No HSM.  MUSCULOSKELETAL: Normal range of motion. Mild kyphosis, no tenderness.  LYMPH NODES: Has no cervical, supraclavicular, axillary and groin adenopathy.   NEUROLOGICAL: Alert and oriented to person, place, and time. No cranial nerve deficit.  Normal muscle tone. Coordination normal.   GENITOURINARY: Deferred exam.  SKIN: Skin is warm and dry. No rash noted. No erythema. No pallor.   EXTREMITIES: No cyanosis, no clubbing, right lower extremity with 3+ edema.  Has 2+ edema in the left lower extremity   no Deformity.  PSYCHIATRIC: Normal mood, affect and behavior.      Lab Results     Recent Results (from the past 240 hour(s))   Glucose by meter    Collection Time: 11/22/23  3:45 AM   Result Value Ref Range    GLUCOSE BY METER POCT 152 (H) 70 - 99 mg/dL   Hemoglobin    Collection Time:  11/22/23  6:15 AM   Result Value Ref Range    Hemoglobin 9.5 (L) 11.7 - 15.7 g/dL   Basic metabolic panel    Collection Time: 11/22/23  6:15 AM   Result Value Ref Range    Sodium 138 135 - 145 mmol/L    Potassium 4.5 3.4 - 5.3 mmol/L    Chloride 102 98 - 107 mmol/L    Carbon Dioxide (CO2) 29 22 - 29 mmol/L    Anion Gap 7 7 - 15 mmol/L    Urea Nitrogen 13.7 8.0 - 23.0 mg/dL    Creatinine 0.63 0.51 - 0.95 mg/dL    GFR Estimate 86 >60 mL/min/1.73m2    Calcium 8.9 8.8 - 10.2 mg/dL    Glucose 131 (H) 70 - 99 mg/dL   Hemoglobin    Collection Time: 11/23/23  7:18 AM   Result Value Ref Range    Hemoglobin 8.1 (L) 11.7 - 15.7 g/dL   Glucose    Collection Time: 11/23/23  7:18 AM   Result Value Ref Range    Glucose 110 (H) 70 - 99 mg/dL   Adult Type and Screen    Collection Time: 11/23/23  7:18 AM   Result Value Ref Range    ABO/RH(D) A POS     Antibody Screen Negative Negative    SPECIMEN EXPIRATION DATE [id number]    Prepare red blood cells (unit)    Collection Time: 11/23/23  8:25 AM   Result Value Ref Range    Blood Component Type Red Blood Cells     Product Code V1843U49     Unit Status Transfused     Unit Number [id number]     CROSSMATCH Compatible     CODING SYSTEM [id number]     ISSUE DATE AND TIME [id number]     UNIT ABO/RH A+     UNIT TYPE ISBT 6200    Basic metabolic panel    Collection Time: 11/24/23  8:46 AM   Result Value Ref Range    Sodium 140 135 - 145 mmol/L    Potassium 3.9 3.4 - 5.3 mmol/L    Chloride 103 98 - 107 mmol/L    Carbon Dioxide (CO2) 31 (H) 22 - 29 mmol/L    Anion Gap 6 (L) 7 - 15 mmol/L    Urea Nitrogen 9.7 8.0 - 23.0 mg/dL    Creatinine 0.50 (L) 0.51 - 0.95 mg/dL    GFR Estimate >90 >60 mL/min/1.73m2    Calcium 8.8 8.8 - 10.2 mg/dL    Glucose 107 (H) 70 - 99 mg/dL   Hemoglobin    Collection Time: 11/24/23  8:46 AM   Result Value Ref Range    Hemoglobin 10.3 (L) 11.7 - 15.7 g/dL                 Electronically signed by    Nannette Bella MD

## 2023-11-27 NOTE — LETTER
11/27/2023        RE: Kylie Amaya  [address]  [facility] [address]        [facility] GERIATRIC SERVICES       Patient Kylie Amaya  MRN: [id number]        Reason for Visit     Chief Complaint   Patient presents with     Hospital F/U       Code Status     DNR / DNI    Assessment     Status post right total hip arthroplasty on 11/21/2023  Hypertension  GERD.    Macular degeneration  CAD  History of prolonged QT  Vitamin D deficiency  Generalized weakness  Osteoporoses    Plan     Pt is admitted to TCU for strengthening and rehab.  Pt admitted to TCU after undergoing rt hip surgery  Date of procedure- 11/21/23  Continue with incisional cares  WBAT  Follow-up with orthopedics as scheduled  Cont PT / OT for strengthening/ gait retraining  Pain management optimized  Tylenol scheduled 1 g every 8 hours  Continue narcotics prn-  Advised aggressive icing  On asa 325mg for dvt prophylaxis x40d  Duration to be determined by orthopedics  Tubigrip stockings recommended for management of swelling of the lower extremities   Doppler ultrasound ordered for the right lower extremity due to excessive swelling noted  Continue with her diuretics  Abdominal pain concerns reviewed discontinued stool softeners.  X-ray was negative.  Continue to monitor-on PPI and Zofran     Patient appears to have this is a chronic complaint she has seen GI for this  Ultrasound of her right upper quadrant in the past has been negative and GI suspects that she may have Gio's disease.  Continue to monitor and if her pain worsens she can follow-up with GI  Recheck labs  Continue with PT/OT    History     Patient is a very pleasant 88 year old female who is admitted to TCU  Patient was admitted electively for right total hip arthroplasty.  She underwent the procedure on 11/21/2023.  Postoperatively has been admitted to the TCU  Now reporting having abdominal pain with some distention and gas abdominal x-ray done was negative.  She believes  she got an extra dose of stool softeners which contributed to her diarrhea and abdominal pain    Past Medical & Surgical History     PAST MEDICAL HISTORY:   Past Medical History:   Diagnosis Date     Arthritis      Coronary artery disease      Gastroesophageal reflux disease      Gilbert's syndrome      Hiatal hernia      History of blood transfusion 1977     History of skin cancer      History of vertebral compression fracture 2015    Lumbar     Hyperlipidemia      Hypertension      Macular degeneration of both eyes     Limited Central Vision     Mild aortic stenosis      Osteoporosis      PONV (postoperative nausea and vomiting)      Prolonged QT interval      Slow to wake up after anesthesia     General anesthesia      PAST SURGICAL HISTORY:   has a past surgical history that includes colonoscopy; Esophagoscopy, gastroscopy, duodenoscopy (EGD), combined (N/A, 11/06/2023); Total Hip Arthroplasty (Left, 10/23/2007); Tonsillectomy (1955); Hysterectomy total abdominal, bilateral salpingo-oophorectomy, combined (1977); Foot surgery (Left); breast biopsy, rt/lt; Elbow surgery (Right); Mohs micrographic procedure; Cataract Extraction (Bilateral); and Arthroplasty hip (Right, 11/21/2023).      Past Social History     Reviewed,  reports that she quit smoking about 46 years ago. Her smoking use included cigarettes. She started smoking about 66 years ago. She has never used smokeless tobacco. She reports current alcohol use. She reports that she does not use drugs.    Family History     Reviewed, and family history is not on file.    Medication List     Current Outpatient Medications   Medication     acetaminophen (TYLENOL) 325 MG tablet     alendronate (FOSAMAX) 70 MG tablet     aspirin (ASA) 325 MG EC tablet     atorvastatin (LIPITOR) 40 MG tablet     calcium carbonate (TUMS) 500 MG chewable tablet     carboxymethylcellulose PF (REFRESH PLUS) 0.5 % ophthalmic solution     cetirizine (ZYRTEC) 5 MG tablet      "cholecalciferol (VITAMIN D3) 1250 mcg (51977 units) capsule     cyanocobalamin (CYANOCOBALAMIN) 1000 MCG/ML injection     diclofenac (VOLTAREN) 1 % topical gel     ferrous sulfate (FEROSUL) 325 (65 Fe) MG tablet     furosemide (LASIX) 20 MG tablet     lidocaine (XYLOCAINE) 5 % external ointment     ondansetron (ZOFRAN) 4 MG tablet     pantoprazole (PROTONIX) 20 MG EC tablet     senna-docusate (SENOKOT-S/PERICOLACE) 8.6-50 MG tablet     traZODone (DESYREL) 50 MG tablet     No current facility-administered medications for this visit.      MED REC REQUIRED  Post Medication Reconciliation Status:  Discharge medications reconciled, continue medications without change       Allergies     Allergies   Allergen Reactions     Azithromycin Diarrhea     Bloody diarrhea     Codeine Nausea and Vomiting and Confusion     Oxycodone Nausea and Vomiting     \"Any oral pain medication causes nausea and vomiting\"-hospital made plan with topical medications-lidocaine & voltaren for the past 6 weeks     Propoxyphene Nausea and Vomiting and Confusion       Review of Systems   A comprehensive review of 14 systems was done. Pertinent findings noted here and in history of present illness. All the rest negative.  Constitutional: Negative.  Negative for fever, chills, she has  activity change, appetite change and fatigue.   HENT: Negative for congestion and facial swelling.    Eyes: Negative for photophobia, redness and visual disturbance.   Respiratory: Negative for cough and chest tightness.    Cardiovascular: Negative for chest pain, palpitations and right greater than left leg swelling.   Reports gaining 11 pounds in the hospital  Gastrointestinal: Negative for nausea, diarrhea, constipation, blood in stool and abdominal distention.   Was having cramping with diarrhea earlier which she believes is due to stool softeners.  Now she is reporting minimal BMs and some abdominal pain with nausea she is not eating much  Genitourinary: Negative.  "   Musculoskeletal: Negative.  Reporting having minimal pain in her right hip  Skin: Negative.    Neurological: Negative for dizziness, tremors, syncope, weakness, light-headedness and headaches.   Hematological: Does not bruise/bleed easily.   Psychiatric/Behavioral: Negative.        Physical Exam   BP (!) 153/77   Pulse 86   Temp 98.1  F (36.7  C)   Resp 16   Ht 1.524 m (5')   Wt 49.4 kg (109 lb)   SpO2 95%   BMI 21.29 kg/m       Constitutional: Oriented to person, place, and time and appears well-developed.   HEENT:  Normocephalic and atraumatic.  Eyes: Conjunctivae and EOM are normal. Pupils are equal, round, and reactive to light. No discharge.  No scleral icterus. Nose normal. Mouth/Throat: Oropharynx is clear and moist. No oropharyngeal exudate.    NECK: Normal range of motion. Neck supple. No JVD present. No tracheal deviation present. No thyromegaly present.   CARDIOVASCULAR: Normal rate, regular rhythm and intact distal pulses.  Exam reveals no gallop and no friction rub.  Systolic murmur present.  PULMONARY: Effort normal and breath sounds normal. No respiratory distress.No Wheezing or rales.  ABDOMEN: Soft. Bowel sounds are normal. No distension and no mass.  There is minimal right upper quadrant tenderness. There is no rebound and no guarding. No HSM.  MUSCULOSKELETAL: Normal range of motion. Mild kyphosis, no tenderness.  LYMPH NODES: Has no cervical, supraclavicular, axillary and groin adenopathy.   NEUROLOGICAL: Alert and oriented to person, place, and time. No cranial nerve deficit.  Normal muscle tone. Coordination normal.   GENITOURINARY: Deferred exam.  SKIN: Skin is warm and dry. No rash noted. No erythema. No pallor.   EXTREMITIES: No cyanosis, no clubbing, right lower extremity with 3+ edema.  Has 2+ edema in the left lower extremity   no Deformity.  PSYCHIATRIC: Normal mood, affect and behavior.      Lab Results     Recent Results (from the past 240 hour(s))   Glucose by meter     Collection Time: 11/22/23  3:45 AM   Result Value Ref Range    GLUCOSE BY METER POCT 152 (H) 70 - 99 mg/dL   Hemoglobin    Collection Time: 11/22/23  6:15 AM   Result Value Ref Range    Hemoglobin 9.5 (L) 11.7 - 15.7 g/dL   Basic metabolic panel    Collection Time: 11/22/23  6:15 AM   Result Value Ref Range    Sodium 138 135 - 145 mmol/L    Potassium 4.5 3.4 - 5.3 mmol/L    Chloride 102 98 - 107 mmol/L    Carbon Dioxide (CO2) 29 22 - 29 mmol/L    Anion Gap 7 7 - 15 mmol/L    Urea Nitrogen 13.7 8.0 - 23.0 mg/dL    Creatinine 0.63 0.51 - 0.95 mg/dL    GFR Estimate 86 >60 mL/min/1.73m2    Calcium 8.9 8.8 - 10.2 mg/dL    Glucose 131 (H) 70 - 99 mg/dL   Hemoglobin    Collection Time: 11/23/23  7:18 AM   Result Value Ref Range    Hemoglobin 8.1 (L) 11.7 - 15.7 g/dL   Glucose    Collection Time: 11/23/23  7:18 AM   Result Value Ref Range    Glucose 110 (H) 70 - 99 mg/dL   Adult Type and Screen    Collection Time: 11/23/23  7:18 AM   Result Value Ref Range    ABO/RH(D) A POS     Antibody Screen Negative Negative    SPECIMEN EXPIRATION DATE [id number]    Prepare red blood cells (unit)    Collection Time: 11/23/23  8:25 AM   Result Value Ref Range    Blood Component Type Red Blood Cells     Product Code [id number]     Unit Status Transfused     Unit Number [id number]     CROSSMATCH Compatible     CODING SYSTEM [id number]     ISSUE DATE AND TIME [id number]     UNIT ABO/RH A+     UNIT TYPE ISBT 6200    Basic metabolic panel    Collection Time: 11/24/23  8:46 AM   Result Value Ref Range    Sodium 140 135 - 145 mmol/L    Potassium 3.9 3.4 - 5.3 mmol/L    Chloride 103 98 - 107 mmol/L    Carbon Dioxide (CO2) 31 (H) 22 - 29 mmol/L    Anion Gap 6 (L) 7 - 15 mmol/L    Urea Nitrogen 9.7 8.0 - 23.0 mg/dL    Creatinine 0.50 (L) 0.51 - 0.95 mg/dL    GFR Estimate >90 >60 mL/min/1.73m2    Calcium 8.8 8.8 - 10.2 mg/dL    Glucose 107 (H) 70 - 99 mg/dL   Hemoglobin    Collection Time: 11/24/23  8:46 AM   Result Value Ref Range     Hemoglobin 10.3 (L) 11.7 - 15.7 g/dL                 Electronically signed by    Nannette Bella MD                            Sincerely,        SAIMA Barrientos

## 2023-11-28 ENCOUNTER — TRANSITIONAL CARE UNIT VISIT (OUTPATIENT)
Dept: GERIATRICS | Facility: CLINIC | Age: 88
End: 2023-11-28
Payer: MEDICARE

## 2023-11-28 ENCOUNTER — LAB REQUISITION (OUTPATIENT)
Dept: LAB | Facility: CLINIC | Age: 88
End: 2023-11-28
Payer: MEDICARE

## 2023-11-28 ENCOUNTER — TELEPHONE (OUTPATIENT)
Dept: GERIATRICS | Facility: CLINIC | Age: 88
End: 2023-11-28

## 2023-11-28 VITALS
RESPIRATION RATE: 18 BRPM | SYSTOLIC BLOOD PRESSURE: 145 MMHG | DIASTOLIC BLOOD PRESSURE: 78 MMHG | HEART RATE: 109 BPM | TEMPERATURE: 98.4 F | HEIGHT: 60 IN | WEIGHT: 109 LBS | OXYGEN SATURATION: 94 % | BODY MASS INDEX: 21.4 KG/M2

## 2023-11-28 DIAGNOSIS — R11.0 NAUSEA: ICD-10-CM

## 2023-11-28 DIAGNOSIS — E46 PROTEIN-CALORIE MALNUTRITION, UNSPECIFIED SEVERITY (H): ICD-10-CM

## 2023-11-28 DIAGNOSIS — K63.8219 SMALL INTESTINAL BACTERIAL OVERGROWTH (SIBO): ICD-10-CM

## 2023-11-28 DIAGNOSIS — E87.6 HYPOKALEMIA: ICD-10-CM

## 2023-11-28 DIAGNOSIS — Z96.641 S/P TOTAL RIGHT HIP ARTHROPLASTY: Primary | ICD-10-CM

## 2023-11-28 DIAGNOSIS — R19.7 DIARRHEA, UNSPECIFIED TYPE: ICD-10-CM

## 2023-11-28 LAB
ANION GAP SERPL CALCULATED.3IONS-SCNC: 9 MMOL/L (ref 7–15)
BUN SERPL-MCNC: 9.9 MG/DL (ref 8–23)
CALCIUM SERPL-MCNC: 8.5 MG/DL (ref 8.8–10.2)
CHLORIDE SERPL-SCNC: 99 MMOL/L (ref 98–107)
CREAT SERPL-MCNC: 0.49 MG/DL (ref 0.51–0.95)
DEPRECATED HCO3 PLAS-SCNC: 29 MMOL/L (ref 22–29)
EGFRCR SERPLBLD CKD-EPI 2021: >90 ML/MIN/1.73M2
ERYTHROCYTE [DISTWIDTH] IN BLOOD BY AUTOMATED COUNT: 14.5 % (ref 10–15)
GLUCOSE SERPL-MCNC: 99 MG/DL (ref 70–99)
HCT VFR BLD AUTO: 31.4 % (ref 35–47)
HGB BLD-MCNC: 10 G/DL (ref 11.7–15.7)
MCH RBC QN AUTO: 31.5 PG (ref 26.5–33)
MCHC RBC AUTO-ENTMCNC: 31.8 G/DL (ref 31.5–36.5)
MCV RBC AUTO: 99 FL (ref 78–100)
PLATELET # BLD AUTO: 352 10E3/UL (ref 150–450)
POTASSIUM SERPL-SCNC: 3.1 MMOL/L (ref 3.4–5.3)
RBC # BLD AUTO: 3.17 10E6/UL (ref 3.8–5.2)
SODIUM SERPL-SCNC: 137 MMOL/L (ref 135–145)
WBC # BLD AUTO: 7.7 10E3/UL (ref 4–11)

## 2023-11-28 PROCEDURE — 85027 COMPLETE CBC AUTOMATED: CPT | Mod: ORL | Performed by: FAMILY MEDICINE

## 2023-11-28 PROCEDURE — 99310 SBSQ NF CARE HIGH MDM 45: CPT | Performed by: NURSE PRACTITIONER

## 2023-11-28 PROCEDURE — 36415 COLL VENOUS BLD VENIPUNCTURE: CPT | Mod: ORL | Performed by: FAMILY MEDICINE

## 2023-11-28 PROCEDURE — P9604 ONE-WAY ALLOW PRORATED TRIP: HCPCS | Mod: ORL | Performed by: FAMILY MEDICINE

## 2023-11-28 PROCEDURE — 87324 CLOSTRIDIUM AG IA: CPT | Mod: ORL | Performed by: FAMILY MEDICINE

## 2023-11-28 PROCEDURE — 80048 BASIC METABOLIC PNL TOTAL CA: CPT | Mod: ORL | Performed by: FAMILY MEDICINE

## 2023-11-28 PROCEDURE — 87493 C DIFF AMPLIFIED PROBE: CPT | Mod: ORL | Performed by: FAMILY MEDICINE

## 2023-11-28 NOTE — TELEPHONE ENCOUNTER
"[facility] Geriatrics Lab Note     Provider: FREEMAN Jaramillo  Facility: [facility]  Facility Type:  TCU    Allergies   Allergen Reactions    Azithromycin Diarrhea     Bloody diarrhea    Codeine Nausea and Vomiting and Confusion    Oxycodone Nausea and Vomiting     \"Any oral pain medication causes nausea and vomiting\"-hospital made plan with topical medications-lidocaine & voltaren for the past 6 weeks    Propoxyphene Nausea and Vomiting and Confusion       Labs Reviewed by provider: Heme 2, BMP     Verbal Order/Direction given by Provider: Potassium 40meq tonight and 20meq tomorrow.  Check potassium level on 11/29/23.      Provider giving Order:  FREEMAN Jaramillo    Verbal Order given to: Kylee([phone])    Marcus Strickland RN  "

## 2023-11-28 NOTE — PROGRESS NOTES
[facility]     Code Status:  FULL CODE  Visit Type: RECHECK (INITIAL)     Facility:   [facility] ([facility]) [[id number]]        History of Present Illness:   Hospital Admission Date: 11/21/2023    Hospital Discharge Date: 11/24/2023      Kylie Amaya is a 88 year old female with past medical history for CAD, GERD, Hiatal hernia, HLD, HTN, osteoporosis, OA.  She was recently hospitalized for a planned right JORDAN.  She had ABLA with hgb 14.5 to 8.1.  She had nausea and diarrhea before discharge and was given Tums.      Today, I follow up with patient after complaining of diarrhea and abdominal pain over the weekend.  Abdominal xray was nonspecific. Senna changed to prn and she had no improvement.  She endorses diffuse tenderness throughout abdomen.  She complains of loose stools and states she has issues with bowels at home between diarrhea and constipation.      Past Medical History:   Diagnosis Date    Arthritis     Coronary artery disease     Gastroesophageal reflux disease     Gilbert's syndrome     Hiatal hernia     History of blood transfusion 1977    History of skin cancer     History of vertebral compression fracture 2015    Lumbar    Hyperlipidemia     Hypertension     Macular degeneration of both eyes     Limited Central Vision    Mild aortic stenosis     Osteoporosis     PONV (postoperative nausea and vomiting)     Prolonged QT interval     Slow to wake up after anesthesia     General anesthesia     Past Surgical History:   Procedure Laterality Date    ARTHROPLASTY HIP Right 11/21/2023    Procedure: RIGHT TOTAL HIP ARTHROPLASTY -POSTERIOR APPROACH;  Surgeon: Syd Templeton MD;  Location: [facility] OR    BREAST BIOPSY, RT/LT      x3    CATARACT EXTRACTION Bilateral     COLONOSCOPY      ELBOW SURGERY Right     1980's    ESOPHAGOSCOPY, GASTROSCOPY, DUODENOSCOPY (EGD), COMBINED N/A 11/06/2023     Procedure: ESOPHAGOGASTRODUODENOSCOPY WITH BIOPSY;  Surgeon: Christofer Bronson MD;  Location: [facility] Main OR    FOOT SURGERY Left     Bone spur removal    HYSTERECTOMY TOTAL ABDOMINAL, BILATERAL SALPINGO-OOPHORECTOMY, COMBINED      MOHS MICROGRAPHIC PROCEDURE      skin cancer removal on nose    TONSILLECTOMY      TOTAL HIP ARTHROPLASTY Left 10/23/2007     History reviewed. No pertinent family history.  Social History     Socioeconomic History    Marital status:      Spouse name: Not on file    Number of children: Not on file    Years of education: Not on file    Highest education level: Not on file   Occupational History    Not on file   Tobacco Use    Smoking status: Former     Types: Cigarettes     Start date:      Quit date:      Years since quittin.9    Smokeless tobacco: Never   Vaping Use    Vaping Use: Never used   Substance and Sexual Activity    Alcohol use: Yes     Comment: 3-4 drinks per week    Drug use: Never    Sexual activity: Not on file   Other Topics Concern    Not on file   Social History Narrative    Not on file     Social Determinants of Health     Financial Resource Strain: Not on file   Food Insecurity: Not on file   Transportation Needs: Not on file   Physical Activity: Not on file   Stress: Not on file   Social Connections: Not on file   Interpersonal Safety: Not on file   Housing Stability: Not on file       Current Outpatient Medications   Medication Sig Dispense Refill    acetaminophen (TYLENOL) 325 MG tablet Take 2 tablets (650 mg) by mouth every 4 hours as needed for other (mild pain) 100 tablet 0    alendronate (FOSAMAX) 70 MG tablet Take 70 mg by mouth every 7 days      aspirin (ASA) 325 MG EC tablet Take 1 tablet (325 mg) by mouth daily 40 tablet 0    atorvastatin (LIPITOR) 40 MG tablet Take 40 mg by mouth daily      calcium carbonate (TUMS) 500 MG chewable tablet Take 1 tablet (500 mg) by mouth 3 times daily as needed for heartburn       "carboxymethylcellulose PF (REFRESH PLUS) 0.5 % ophthalmic solution Place 1 drop into both eyes 3 times daily as needed for dry eyes      cetirizine (ZYRTEC) 5 MG tablet Take 5 mg by mouth daily      cholecalciferol (VITAMIN D3) 1250 mcg (23437 units) capsule Take 1,250 mcg by mouth every 7 days      cyanocobalamin (CYANOCOBALAMIN) 1000 MCG/ML injection Inject 1 mL into the muscle every 30 days Takes on last day of month      diclofenac (VOLTAREN) 1 % topical gel Apply 2 g topically 4 times daily 200 g 0    ferrous sulfate (FEROSUL) 325 (65 Fe) MG tablet Take 1 tablet (325 mg) by mouth every other day 60 tablet 0    furosemide (LASIX) 20 MG tablet Take 20 mg by mouth daily as needed (BP >150)      lidocaine (XYLOCAINE) 5 % external ointment Apply topically 4 times daily as needed for moderate pain      ondansetron (ZOFRAN) 4 MG tablet Take 8 mg by mouth every 8 hours as needed for nausea      pantoprazole (PROTONIX) 20 MG EC tablet Take 20 mg by mouth 2 times daily      senna-docusate (SENOKOT-S/PERICOLACE) 8.6-50 MG tablet Take 1-2 tablets by mouth 2 times daily Take while on oral narcotics to prevent or treat constipation. 30 tablet 0    traZODone (DESYREL) 50 MG tablet Take 50 mg by mouth at bedtime       Allergies   Allergen Reactions    Azithromycin Diarrhea     Bloody diarrhea    Codeine Nausea and Vomiting and Confusion    Oxycodone Nausea and Vomiting     \"Any oral pain medication causes nausea and vomiting\"-hospital made plan with topical medications-lidocaine & voltaren for the past 6 weeks    Propoxyphene Nausea and Vomiting and Confusion     Immunization History   Administered Date(s) Administered    COVID-19 12+ (2023-24) (MODERNA) 10/27/2023    COVID-19 Bivalent 18+ (Moderna) 11/11/2022    COVID-19 Monovalent 18+ (Moderna) 01/28/2021, 02/25/2021, 11/05/2021, 04/15/2022    Flu 65+ Years 10/11/2017, 10/17/2018, 10/29/2019    Influenza (High Dose) 3 valent vaccine 09/29/2014, 10/05/2015, 10/07/2016    " Influenza (IIV3) PF 11/12/2003, 10/22/2004, 11/16/2005, 10/18/2006, 11/13/2006, 10/26/2007, 11/10/2008, 10/05/2009, 10/13/2011, 09/19/2012    Influenza Vaccine 65+ (FLUAD) 10/30/2020, 10/01/2021, 09/28/2022, 09/27/2023    Influenza Vaccine 65+ (Fluzone HD) 10/14/2021    Influenza Vaccine >6 months,quad, PF 09/19/2012    Influenza, seasonal, injectable, PF 10/13/2011    Pneumo Conj 13-V (2010&after) 07/29/2015    Pneumococcal 23 valent 10/18/2002, 10/26/2007    TDAP (Adacel,Boostrix) 09/19/2012    Td (Adult), Adsorbed 08/21/2003, 11/11/2022    Zoster recombinant adjuvanted (SHINGRIX) 04/10/2018, 10/17/2018    Zoster vaccine, live 06/18/2009         Post Discharge Medication Reconciliation Status: discharge medications reconciled and changed, per note/orders.    Medications list and allergies in the facility chart have been reviewed.  Please see facility EMR for most up to date list.         Review of Systems   Patient denies fever, chills, headache, lightheadedness, dizziness, rhinorrhea, cough, congestion, shortness of breath, chest pain, palpitations, constipation, change in appetite, change in sleep pattern, dysuria, frequency, burning or pain with urination.  Other than stated in HPI all other review of systems is negative.       Physical Exam  Vital signs:BP (!) 145/78   Pulse 109   Temp 98.4  F (36.9  C)   Resp 18   Ht 1.524 m (5')   Wt 49.4 kg (109 lb)   SpO2 94%   BMI 21.29 kg/m     GENERAL APPEARANCE: thin, elderly female, in no acute distress.  HEENT: normocephalic, atraumatic  sclerae anicteric, conjunctivae clear and moist, EOM intact  LUNGS: Lung sounds CTA, no adventitious sounds, respiratory effort normal.  CARD: RRR, S1, S2, without murmurs, gallops, rubs  ABD: soft, nondistended, diffuse tenderness throughout.  Positive BS in all 4 quads.   MSK: Muscle strength and tone were equal bilaterally. Moves all extremities easily and intentionally.   EXTREMITIES: soft edema of RLL, good CMS and no  calf tenderness.  US negative bilaterally.   NEURO: Alert and oriented x 3. Face is symmetric.  SKIN: incision not viewed by me today.   PSYCH: euthymic        Labs:    Last Comprehensive Metabolic Panel:  Lab Results   Component Value Date     11/24/2023    POTASSIUM 3.9 11/24/2023    CHLORIDE 103 11/24/2023    CO2 31 (H) 11/24/2023    ANIONGAP 6 (L) 11/24/2023     (H) 11/24/2023    BUN 9.7 11/24/2023    CR 0.50 (L) 11/24/2023    GFRESTIMATED >90 11/24/2023    OTILIO 8.8 11/24/2023     Lab Results   Component Value Date    WBC 7.7 11/28/2023     Lab Results   Component Value Date    RBC 3.17 11/28/2023     Lab Results   Component Value Date    HGB 10.0 11/28/2023     Lab Results   Component Value Date    HCT 31.4 11/28/2023     Lab Results   Component Value Date    MCV 99 11/28/2023     Lab Results   Component Value Date    MCH 31.5 11/28/2023     Lab Results   Component Value Date    MCHC 31.8 11/28/2023     Lab Results   Component Value Date    RDW 14.5 11/28/2023     Lab Results   Component Value Date     11/28/2023           Assessment/plan:   S/P total right hip arthroplasty  Pain controlled, continue with apap and lidoderm.  Follow up with orthopedics in 2 weeks.  Cotninue with ASA until told when to stop by ortho.  Pt/OT eval an treat.     Diarrhea, unspecified type  Small intestinal bacterial overgrowth (SIBO)  Check for cdiff, advised patient on SIBO and FODMAP diet.  Start banaflakes.  If cdiff negative start imodium.  Advised on mild imodium use.  Follow up with GI as indicated.    Nausea  Related to hiatal hernia and SIBO.  Advised on elimination and FoDmap diet.  Continue with zofran.     Protein-calorie malnutrition, unspecified severity (H24)  Nutritional supplement.     DISCHARGE PLAN/FACE TO FACE:  I certify that this patient is under my care and that I, or a nurse practitioner or physician's assistant working with me, had a face-to-face encounter that meets the physician  face-to-face encounter requirements with this patient.   Date of Face-to-Face Encounter: 11/28/2023    I certify that, based on my findings, the following services are medically necessary home health services: RN, PT/OT    My clinical findings support the need for the above skilled services because: RN for medication management and teaching, PT/OT for ongoing endurance and strengthening    This patient is homebound because: She requires maximum effort in order to get out into the community on a regular basis.     The patient is, or has been, under my care and I have initiated the establishment of the plan of care. This patient will be followed by a physician who will periodically review the plan of care.    45 total minutes spent reviewing medical records from [facility], assessing patient and coordinating the above plan of care       Electronically signed by: Aileen Reynolds NP

## 2023-11-28 NOTE — LETTER
11/28/2023        RE: Kylie Amaya  [address]  [facility] [address]                                                                                      [facility]     Code Status:  FULL CODE  Visit Type: RECHECK (INITIAL)     Facility:   [facility] ([facility]) [[id number]]        History of Present Illness:   Hospital Admission Date: 11/21/2023    Hospital Discharge Date: 11/24/2023      Kylie Amaya is a 88 year old female with past medical history for CAD, GERD, Hiatal hernia, HLD, HTN, osteoporosis, OA.  She was recently hospitalized for a planned right JORDAN.  She had ABLA with hgb 14.5 to 8.1.  She had nausea and diarrhea before discharge and was given Tums.      Today, I follow up with patient after complaining of diarrhea and abdominal pain over the weekend.  Abdominal xray was nonspecific. Senna changed to prn and she had no improvement.  She endorses diffuse tenderness throughout abdomen.  She complains of loose stools and states she has issues with bowels at home between diarrhea and constipation.      Past Medical History:   Diagnosis Date     Arthritis      Coronary artery disease      Gastroesophageal reflux disease      Gilbert's syndrome      Hiatal hernia      History of blood transfusion 1977     History of skin cancer      History of vertebral compression fracture 2015    Lumbar     Hyperlipidemia      Hypertension      Macular degeneration of both eyes     Limited Central Vision     Mild aortic stenosis      Osteoporosis      PONV (postoperative nausea and vomiting)      Prolonged QT interval      Slow to wake up after anesthesia     General anesthesia     Past Surgical History:   Procedure Laterality Date     ARTHROPLASTY HIP Right 11/21/2023    Procedure: RIGHT TOTAL HIP ARTHROPLASTY -POSTERIOR APPROACH;  Surgeon: Syd Templeton MD;  Location: [facility] OR     BREAST BIOPSY, RT/LT      x3     CATARACT EXTRACTION Bilateral      COLONOSCOPY        ELBOW SURGERY Right          ESOPHAGOSCOPY, GASTROSCOPY, DUODENOSCOPY (EGD), COMBINED N/A 2023    Procedure: ESOPHAGOGASTRODUODENOSCOPY WITH BIOPSY;  Surgeon: Christofer Bronson MD;  Location: [facility] OR     FOOT SURGERY Left     Bone spur removal     HYSTERECTOMY TOTAL ABDOMINAL, BILATERAL SALPINGO-OOPHORECTOMY, COMBINED       MOHS MICROGRAPHIC PROCEDURE      skin cancer removal on nose     TONSILLECTOMY       TOTAL HIP ARTHROPLASTY Left 10/23/2007     History reviewed. No pertinent family history.  Social History     Socioeconomic History     Marital status:      Spouse name: Not on file     Number of children: Not on file     Years of education: Not on file     Highest education level: Not on file   Occupational History     Not on file   Tobacco Use     Smoking status: Former     Types: Cigarettes     Start date:      Quit date:      Years since quittin.9     Smokeless tobacco: Never   Vaping Use     Vaping Use: Never used   Substance and Sexual Activity     Alcohol use: Yes     Comment: 3-4 drinks per week     Drug use: Never     Sexual activity: Not on file   Other Topics Concern     Not on file   Social History Narrative     Not on file     Social Determinants of Health     Financial Resource Strain: Not on file   Food Insecurity: Not on file   Transportation Needs: Not on file   Physical Activity: Not on file   Stress: Not on file   Social Connections: Not on file   Interpersonal Safety: Not on file   Housing Stability: Not on file       Current Outpatient Medications   Medication Sig Dispense Refill     acetaminophen (TYLENOL) 325 MG tablet Take 2 tablets (650 mg) by mouth every 4 hours as needed for other (mild pain) 100 tablet 0     alendronate (FOSAMAX) 70 MG tablet Take 70 mg by mouth every 7 days       aspirin (ASA) 325 MG EC tablet Take 1 tablet (325 mg) by mouth daily 40 tablet 0     atorvastatin (LIPITOR) 40 MG tablet Take 40 mg by mouth  "daily       calcium carbonate (TUMS) 500 MG chewable tablet Take 1 tablet (500 mg) by mouth 3 times daily as needed for heartburn       carboxymethylcellulose PF (REFRESH PLUS) 0.5 % ophthalmic solution Place 1 drop into both eyes 3 times daily as needed for dry eyes       cetirizine (ZYRTEC) 5 MG tablet Take 5 mg by mouth daily       cholecalciferol (VITAMIN D3) 1250 mcg (77846 units) capsule Take 1,250 mcg by mouth every 7 days       cyanocobalamin (CYANOCOBALAMIN) 1000 MCG/ML injection Inject 1 mL into the muscle every 30 days Takes on last day of month       diclofenac (VOLTAREN) 1 % topical gel Apply 2 g topically 4 times daily 200 g 0     ferrous sulfate (FEROSUL) 325 (65 Fe) MG tablet Take 1 tablet (325 mg) by mouth every other day 60 tablet 0     furosemide (LASIX) 20 MG tablet Take 20 mg by mouth daily as needed (BP >150)       lidocaine (XYLOCAINE) 5 % external ointment Apply topically 4 times daily as needed for moderate pain       ondansetron (ZOFRAN) 4 MG tablet Take 8 mg by mouth every 8 hours as needed for nausea       pantoprazole (PROTONIX) 20 MG EC tablet Take 20 mg by mouth 2 times daily       senna-docusate (SENOKOT-S/PERICOLACE) 8.6-50 MG tablet Take 1-2 tablets by mouth 2 times daily Take while on oral narcotics to prevent or treat constipation. 30 tablet 0     traZODone (DESYREL) 50 MG tablet Take 50 mg by mouth at bedtime       Allergies   Allergen Reactions     Azithromycin Diarrhea     Bloody diarrhea     Codeine Nausea and Vomiting and Confusion     Oxycodone Nausea and Vomiting     \"Any oral pain medication causes nausea and vomiting\"-hospital made plan with topical medications-lidocaine & voltaren for the past 6 weeks     Propoxyphene Nausea and Vomiting and Confusion     Immunization History   Administered Date(s) Administered     COVID-19 12+ (2023-24) (MODERNA) 10/27/2023     COVID-19 Bivalent 18+ (Moderna) 11/11/2022     COVID-19 Monovalent 18+ (Moderna) 01/28/2021, 02/25/2021, " 11/05/2021, 04/15/2022     Flu 65+ Years 10/11/2017, 10/17/2018, 10/29/2019     Influenza (High Dose) 3 valent vaccine 09/29/2014, 10/05/2015, 10/07/2016     Influenza (IIV3) PF 11/12/2003, 10/22/2004, 11/16/2005, 10/18/2006, 11/13/2006, 10/26/2007, 11/10/2008, 10/05/2009, 10/13/2011, 09/19/2012     Influenza Vaccine 65+ (FLUAD) 10/30/2020, 10/01/2021, 09/28/2022, 09/27/2023     Influenza Vaccine 65+ (Fluzone HD) 10/14/2021     Influenza Vaccine >6 months,quad, PF 09/19/2012     Influenza, seasonal, injectable, PF 10/13/2011     Pneumo Conj 13-V (2010&after) 07/29/2015     Pneumococcal 23 valent 10/18/2002, 10/26/2007     TDAP (Adacel,Boostrix) 09/19/2012     Td (Adult), Adsorbed 08/21/2003, 11/11/2022     Zoster recombinant adjuvanted (SHINGRIX) 04/10/2018, 10/17/2018     Zoster vaccine, live 06/18/2009         Post Discharge Medication Reconciliation Status: discharge medications reconciled and changed, per note/orders.    Medications list and allergies in the facility chart have been reviewed.  Please see facility EMR for most up to date list.         Review of Systems   Patient denies fever, chills, headache, lightheadedness, dizziness, rhinorrhea, cough, congestion, shortness of breath, chest pain, palpitations, constipation, change in appetite, change in sleep pattern, dysuria, frequency, burning or pain with urination.  Other than stated in HPI all other review of systems is negative.       Physical Exam  Vital signs:BP (!) 145/78   Pulse 109   Temp 98.4  F (36.9  C)   Resp 18   Ht 1.524 m (5')   Wt 49.4 kg (109 lb)   SpO2 94%   BMI 21.29 kg/m     GENERAL APPEARANCE: thin, elderly female, in no acute distress.  HEENT: normocephalic, atraumatic  sclerae anicteric, conjunctivae clear and moist, EOM intact  LUNGS: Lung sounds CTA, no adventitious sounds, respiratory effort normal.  CARD: RRR, S1, S2, without murmurs, gallops, rubs  ABD: soft, nondistended, diffuse tenderness throughout.  Positive BS in  all 4 quads.   MSK: Muscle strength and tone were equal bilaterally. Moves all extremities easily and intentionally.   EXTREMITIES: soft edema of RLL, good CMS and no calf tenderness.  US negative bilaterally.   NEURO: Alert and oriented x 3. Face is symmetric.  SKIN: incision not viewed by me today.   PSYCH: euthymic        Labs:    Last Comprehensive Metabolic Panel:  Lab Results   Component Value Date     11/24/2023    POTASSIUM 3.9 11/24/2023    CHLORIDE 103 11/24/2023    CO2 31 (H) 11/24/2023    ANIONGAP 6 (L) 11/24/2023     (H) 11/24/2023    BUN 9.7 11/24/2023    CR 0.50 (L) 11/24/2023    GFRESTIMATED >90 11/24/2023    OTILIO 8.8 11/24/2023     Lab Results   Component Value Date    WBC 7.7 11/28/2023     Lab Results   Component Value Date    RBC 3.17 11/28/2023     Lab Results   Component Value Date    HGB 10.0 11/28/2023     Lab Results   Component Value Date    HCT 31.4 11/28/2023     Lab Results   Component Value Date    MCV 99 11/28/2023     Lab Results   Component Value Date    MCH 31.5 11/28/2023     Lab Results   Component Value Date    MCHC 31.8 11/28/2023     Lab Results   Component Value Date    RDW 14.5 11/28/2023     Lab Results   Component Value Date     11/28/2023           Assessment/plan:   S/P total right hip arthroplasty  Pain controlled, continue with apap and lidoderm.  Follow up with orthopedics in 2 weeks.  Cotninue with ASA until told when to stop by ortho.  Pt/OT eval an treat.     Diarrhea, unspecified type  Small intestinal bacterial overgrowth (SIBO)  Check for cdiff, advised patient on SIBO and FODMAP diet.  Start banaflakes.  If cdiff negative start imodium.  Advised on mild imodium use.  Follow up with GI as indicated.    Nausea  Related to hiatal hernia and SIBO.  Advised on elimination and FoDmap diet.  Continue with zofran.     Protein-calorie malnutrition, unspecified severity (H24)  Nutritional supplement.     DISCHARGE PLAN/FACE TO FACE:  I certify that this  patient is under my care and that I, or a nurse practitioner or physician's assistant working with me, had a face-to-face encounter that meets the physician face-to-face encounter requirements with this patient.   Date of Face-to-Face Encounter: 11/28/2023    I certify that, based on my findings, the following services are medically necessary home health services: RN, PT/OT    My clinical findings support the need for the above skilled services because: RN for medication management and teaching, PT/OT for ongoing endurance and strengthening    This patient is homebound because: She requires maximum effort in order to get out into the community on a regular basis.     The patient is, or has been, under my care and I have initiated the establishment of the plan of care. This patient will be followed by a physician who will periodically review the plan of care.    45 total minutes spent reviewing medical records from [facility], assessing patient and coordinating the above plan of care       Electronically signed by: Aileen Reynolds NP      Sincerely,        Aileen Reynolds NP

## 2023-11-29 ENCOUNTER — TELEPHONE (OUTPATIENT)
Dept: GERIATRICS | Facility: CLINIC | Age: 88
End: 2023-11-29

## 2023-11-29 ENCOUNTER — TELEPHONE (OUTPATIENT)
Dept: GERIATRICS | Facility: CLINIC | Age: 88
End: 2023-11-29
Payer: MEDICARE

## 2023-11-29 LAB
C DIFF GDH STL QL IA: POSITIVE
C DIFF TOX A+B STL QL IA: NEGATIVE
C DIFF TOX B STL QL: POSITIVE
POTASSIUM SERPL-SCNC: 3.7 MMOL/L (ref 3.4–5.3)

## 2023-11-29 PROCEDURE — 36415 COLL VENOUS BLD VENIPUNCTURE: CPT | Mod: ORL | Performed by: FAMILY MEDICINE

## 2023-11-29 PROCEDURE — P9604 ONE-WAY ALLOW PRORATED TRIP: HCPCS | Mod: ORL | Performed by: FAMILY MEDICINE

## 2023-11-29 PROCEDURE — 84132 ASSAY OF SERUM POTASSIUM: CPT | Mod: ORL | Performed by: FAMILY MEDICINE

## 2023-11-29 NOTE — TELEPHONE ENCOUNTER
"[facility] Note     Provider: FREEMAN Jaramillo  Facility: [facility]  Facility Type:  TCU    Allergies   Allergen Reactions    Azithromycin Diarrhea     Bloody diarrhea    Codeine Nausea and Vomiting and Confusion    Oxycodone Nausea and Vomiting     \"Any oral pain medication causes nausea and vomiting\"-hospital made plan with topical medications-lidocaine & voltaren for the past 6 weeks    Propoxyphene Nausea and Vomiting and Confusion       Labs Reviewed by provider: potassium     Verbal Order/Direction given by Provider: No new orders.      Provider giving Order:  FREEMAN Jaramillo    Verbal Order given to: Asha([phone])    Marcus Strickland RN  "

## 2023-11-29 NOTE — RESULT ENCOUNTER NOTE
Start oral Vancomycin 125mg every 6 hours x 10 days.  Discontinue imodium.  Start Lactobacillus 10 billion 2 caps daily x 30 days.

## 2024-01-13 ENCOUNTER — HOSPITAL ENCOUNTER (EMERGENCY)
Facility: CLINIC | Age: 89
Discharge: HOME | End: 2024-01-13
Attending: EMERGENCY MEDICINE | Admitting: EMERGENCY MEDICINE
Payer: MEDICARE

## 2024-01-13 ENCOUNTER — APPOINTMENT (OUTPATIENT)
Dept: CT IMAGING | Facility: CLINIC | Age: 89
End: 2024-01-13
Attending: EMERGENCY MEDICINE
Payer: MEDICARE

## 2024-01-13 VITALS
DIASTOLIC BLOOD PRESSURE: 58 MMHG | SYSTOLIC BLOOD PRESSURE: 112 MMHG | HEIGHT: 60 IN | TEMPERATURE: 97.6 F | RESPIRATION RATE: 16 BRPM | WEIGHT: 103 LBS | HEART RATE: 88 BPM | OXYGEN SATURATION: 95 % | BODY MASS INDEX: 20.22 KG/M2
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
## 2024-01-13 DIAGNOSIS — S00.03XA HEMATOMA OF FRONTAL SCALP, INITIAL ENCOUNTER: ICD-10-CM
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
## 2024-01-13 PROCEDURE — 70450 CT HEAD/BRAIN W/O DYE: CPT | Mod: ME

## 2024-01-13 PROCEDURE — 99284 EMERGENCY DEPT VISIT MOD MDM: CPT | Mod: 25

## 2024-01-13 PROCEDURE — 250N000013 HC RX MED GY IP 250 OP 250 PS 637: Performed by: EMERGENCY MEDICINE

## 2024-01-13 RX ORDER — ACETAMINOPHEN 325 MG/1
975 TABLET ORAL ONCE
Status: COMPLETED | OUTPATIENT
Start: 2024-01-13 | End: 2024-01-13

## 2024-01-13 RX ADMIN — ACETAMINOPHEN 975 MG: 325 TABLET ORAL at 15:35

## 2024-01-13 ASSESSMENT — ACTIVITIES OF DAILY LIVING (ADL): ADLS_ACUITY_SCORE: 33

## 2024-01-13 NOTE — ED TRIAGE NOTES
Pt tripped and fell hitting her head on a door jam and landing on a wood floor.  Denies LOC.  Not on blood thinners.  Right hip was recently replaced but denies pain.  Fall witnessed by daughter.     Triage Assessment (Adult)       Row Name 01/13/24 3357          Triage Assessment    Airway WDL WDL        Respiratory WDL    Respiratory WDL WDL        Skin Circulation/Temperature WDL    Skin Circulation/Temperature WDL WDL        Cardiac WDL    Cardiac WDL WDL        Peripheral/Neurovascular WDL    Peripheral Neurovascular WDL WDL        Cognitive/Neuro/Behavioral WDL    Cognitive/Neuro/Behavioral WDL WDL

## 2024-12-30 NOTE — TELEPHONE ENCOUNTER
----- Message from Aileen Reynolds NP sent at 11/29/2023  8:06 AM CST -----  Start oral Vancomycin 125mg every 6 hours x 10 days.  Discontinue imodium.  Start Lactobacillus 10 billion 2 caps daily x 30 days.   
Telephone order given to nurse Asha Schroeder, RN on 11/29/2023 at 8:31 AM     
Bipolar mood disorder   F31.9  
Bipolar mood disorder   F31.9

## 2025-05-28 ENCOUNTER — HOSPITAL ENCOUNTER (OUTPATIENT)
Dept: CARDIAC REHAB | Facility: CLINIC | Age: 89
Discharge: HOME | End: 2025-05-28
Attending: INTERNAL MEDICINE
Payer: MEDICARE

## 2025-05-28 PROCEDURE — 93797 PHYS/QHP OP CAR RHAB WO ECG: CPT

## 2025-05-28 PROCEDURE — 93798 PHYS/QHP OP CAR RHAB W/ECG: CPT

## 2025-06-02 ENCOUNTER — HOSPITAL ENCOUNTER (OUTPATIENT)
Dept: CARDIAC REHAB | Facility: CLINIC | Age: 89
Discharge: HOME | End: 2025-06-02
Attending: INTERNAL MEDICINE
Payer: MEDICARE

## 2025-06-02 PROCEDURE — 93798 PHYS/QHP OP CAR RHAB W/ECG: CPT

## 2025-06-03 ENCOUNTER — DOCUMENTATION ONLY (OUTPATIENT)
Dept: OTHER | Facility: CLINIC | Age: 89
End: 2025-06-03
Payer: MEDICARE

## 2025-06-04 ENCOUNTER — HOSPITAL ENCOUNTER (OUTPATIENT)
Dept: CARDIAC REHAB | Facility: CLINIC | Age: 89
Discharge: HOME | End: 2025-06-04
Attending: INTERNAL MEDICINE
Payer: MEDICARE

## 2025-06-04 PROCEDURE — 93798 PHYS/QHP OP CAR RHAB W/ECG: CPT

## 2025-06-09 ENCOUNTER — HOSPITAL ENCOUNTER (OUTPATIENT)
Dept: CARDIAC REHAB | Facility: CLINIC | Age: 89
Discharge: HOME | End: 2025-06-09
Attending: INTERNAL MEDICINE
Payer: MEDICARE

## 2025-06-09 PROCEDURE — 93798 PHYS/QHP OP CAR RHAB W/ECG: CPT

## 2025-06-11 ENCOUNTER — HOSPITAL ENCOUNTER (OUTPATIENT)
Dept: CARDIAC REHAB | Facility: CLINIC | Age: 89
Discharge: HOME | End: 2025-06-11
Attending: INTERNAL MEDICINE
Payer: MEDICARE

## 2025-06-11 PROCEDURE — 93798 PHYS/QHP OP CAR RHAB W/ECG: CPT

## 2025-06-18 ENCOUNTER — HOSPITAL ENCOUNTER (OUTPATIENT)
Dept: CARDIAC REHAB | Facility: CLINIC | Age: 89
Discharge: HOME | End: 2025-06-18
Attending: INTERNAL MEDICINE
Payer: MEDICARE

## 2025-06-18 PROCEDURE — 93798 PHYS/QHP OP CAR RHAB W/ECG: CPT

## 2025-06-23 ENCOUNTER — HOSPITAL ENCOUNTER (OUTPATIENT)
Dept: CARDIAC REHAB | Facility: CLINIC | Age: 89
Discharge: HOME | End: 2025-06-23
Attending: INTERNAL MEDICINE
Payer: MEDICARE

## 2025-06-23 PROCEDURE — 93798 PHYS/QHP OP CAR RHAB W/ECG: CPT

## 2025-06-25 ENCOUNTER — HOSPITAL ENCOUNTER (OUTPATIENT)
Dept: CARDIAC REHAB | Facility: CLINIC | Age: 89
Discharge: HOME | End: 2025-06-25
Attending: INTERNAL MEDICINE
Payer: MEDICARE

## 2025-06-25 PROCEDURE — 93798 PHYS/QHP OP CAR RHAB W/ECG: CPT

## 2025-06-30 ENCOUNTER — HOSPITAL ENCOUNTER (OUTPATIENT)
Dept: CARDIAC REHAB | Facility: CLINIC | Age: 89
Discharge: HOME | End: 2025-06-30
Attending: INTERNAL MEDICINE
Payer: MEDICARE

## 2025-06-30 PROCEDURE — 93798 PHYS/QHP OP CAR RHAB W/ECG: CPT

## 2025-07-02 ENCOUNTER — HOSPITAL ENCOUNTER (OUTPATIENT)
Dept: CARDIAC REHAB | Facility: CLINIC | Age: 89
Discharge: HOME | End: 2025-07-02
Attending: INTERNAL MEDICINE
Payer: MEDICARE

## 2025-07-02 PROCEDURE — 93798 PHYS/QHP OP CAR RHAB W/ECG: CPT

## 2025-07-07 ENCOUNTER — HOSPITAL ENCOUNTER (OUTPATIENT)
Dept: CARDIAC REHAB | Facility: CLINIC | Age: 89
Discharge: HOME | End: 2025-07-07
Attending: INTERNAL MEDICINE
Payer: MEDICARE

## 2025-07-07 PROCEDURE — 93798 PHYS/QHP OP CAR RHAB W/ECG: CPT

## 2025-07-09 ENCOUNTER — HOSPITAL ENCOUNTER (OUTPATIENT)
Dept: CARDIAC REHAB | Facility: CLINIC | Age: 89
Discharge: HOME | End: 2025-07-09
Attending: INTERNAL MEDICINE
Payer: MEDICARE

## 2025-07-09 PROCEDURE — 93798 PHYS/QHP OP CAR RHAB W/ECG: CPT

## 2025-07-14 ENCOUNTER — HOSPITAL ENCOUNTER (OUTPATIENT)
Dept: CARDIAC REHAB | Facility: CLINIC | Age: 89
Discharge: HOME | End: 2025-07-14
Attending: INTERNAL MEDICINE
Payer: MEDICARE

## 2025-07-14 PROCEDURE — 93798 PHYS/QHP OP CAR RHAB W/ECG: CPT

## (undated) DEVICE — DRSG AQUACEL AG HYDROFIBER  3.5X10" 422605

## (undated) DEVICE — BONE CLEANING TIP INTERPULSE  0210-010-000

## (undated) DEVICE — SUCTION MANIFOLD NEPTUNE 2 SYS 1 PORT 702-025-000

## (undated) DEVICE — SUCTION IRR SYSTEM W/O TIP INTERPULSE HANDPIECE 0210-100-000

## (undated) DEVICE — GLOVE UNDER INDICATOR PI SZ 7.0 LF 41670

## (undated) DEVICE — MEGADYNE PLATE

## (undated) DEVICE — SU STRATAFIX PDS PLUS 2 CTX SPIRAL L14 IN SXPP2B405

## (undated) DEVICE — SU STRATAFIX MONOCRYL 3-0 SPIRAL PS-2 30CM SXMP1B106

## (undated) DEVICE — SU ETHIBOND 5 V-37 4X30" MB66G

## (undated) DEVICE — SOL NACL 0.9% INJ 1000ML BAG 2B1324X

## (undated) DEVICE — PLATE GROUNDING ADULT W/CORD 9165L

## (undated) DEVICE — A3 SUPPLIES- SEE NURSING INFO PAGE

## (undated) DEVICE — SUCTION SLEEVE NEPTUNE 2 165MM 0703-005-165

## (undated) DEVICE — BLADE SAGITTAL WIDE (SO-618) 2108-118

## (undated) DEVICE — SUTURE VICRYL+ 2-0 27IN CT-1 UND VCP259H

## (undated) DEVICE — SOL WATER IRRIG 1000ML BOTTLE 2F7114

## (undated) DEVICE — SOL NACL 0.9% IRRIG 1000ML BOTTLE 2F7124

## (undated) DEVICE — CUSTOM PACK TOTAL HIP SOP5BTHHEA

## (undated) DEVICE — MAT FLOOR SURGICAL 40X38 0702140238

## (undated) DEVICE — GLOVE BIOGEL INDICATOR 7.5 LF 41675

## (undated) DEVICE — GLOVE BIOGEL PI ULTRATOUCH G SZ 6.5 42165

## (undated) DEVICE — GLOVE BIOGEL PI ORTHOPRO SZ 7.5 47675

## (undated) DEVICE — TUBING SUCTION MEDI-VAC 1/4"X20' N620A

## (undated) DEVICE — SUCTION MANIFOLD NEPTUNE 2 SYS 4 PORT 0702-020-000

## (undated) DEVICE — DRAPE IOBAN INCISE 23X17" 6650EZ

## (undated) DEVICE — DECANTER VIAL 2006S

## (undated) RX ORDER — FENTANYL CITRATE 50 UG/ML
INJECTION, SOLUTION INTRAMUSCULAR; INTRAVENOUS
Status: DISPENSED
Start: 2023-11-21

## (undated) RX ORDER — BUPIVACAINE HYDROCHLORIDE 5 MG/ML
INJECTION, SOLUTION EPIDURAL; INTRACAUDAL
Status: DISPENSED
Start: 2023-11-21